# Patient Record
Sex: MALE | Race: WHITE | NOT HISPANIC OR LATINO | Employment: OTHER | URBAN - METROPOLITAN AREA
[De-identification: names, ages, dates, MRNs, and addresses within clinical notes are randomized per-mention and may not be internally consistent; named-entity substitution may affect disease eponyms.]

---

## 2017-02-16 PROBLEM — C88.0 WALDENSTROM MACROGLOBULINEMIA: Status: ACTIVE | Noted: 2017-02-16

## 2017-02-16 PROBLEM — C88.00 WALDENSTROM MACROGLOBULINEMIA: Status: ACTIVE | Noted: 2017-02-16

## 2019-04-25 PROBLEM — I10 ESSENTIAL HYPERTENSION: Status: ACTIVE | Noted: 2019-04-25

## 2019-04-25 PROBLEM — E66.811 CLASS 1 OBESITY WITH SERIOUS COMORBIDITY AND BODY MASS INDEX (BMI) OF 32.0 TO 32.9 IN ADULT: Status: ACTIVE | Noted: 2019-04-25

## 2019-04-25 PROBLEM — B35.4 TINEA CORPORIS: Status: ACTIVE | Noted: 2019-04-25

## 2019-04-25 PROBLEM — H40.2230 CHRONIC PRIMARY ANGLE-CLOSURE GLAUCOMA OF BOTH EYES: Status: ACTIVE | Noted: 2019-04-25

## 2019-04-25 PROBLEM — Z98.890 STATUS POST BALLOON DILATATION OF ESOPHAGEAL STRICTURE: Status: ACTIVE | Noted: 2019-04-25

## 2019-04-25 PROBLEM — E66.9 CLASS 1 OBESITY WITH SERIOUS COMORBIDITY AND BODY MASS INDEX (BMI) OF 32.0 TO 32.9 IN ADULT: Status: ACTIVE | Noted: 2019-04-25

## 2019-10-31 PROBLEM — B35.4 TINEA CORPORIS: Status: RESOLVED | Noted: 2019-04-25 | Resolved: 2019-10-31

## 2019-10-31 PROBLEM — E66.09 CLASS 1 OBESITY DUE TO EXCESS CALORIES WITH SERIOUS COMORBIDITY AND BODY MASS INDEX (BMI) OF 33.0 TO 33.9 IN ADULT: Status: ACTIVE | Noted: 2019-04-25

## 2020-05-26 PROBLEM — C67.9 MALIGNANT NEOPLASM OF URINARY BLADDER: Status: ACTIVE | Noted: 2020-05-26

## 2020-11-11 PROBLEM — K21.9 GASTROESOPHAGEAL REFLUX DISEASE WITHOUT ESOPHAGITIS: Status: ACTIVE | Noted: 2020-11-11

## 2021-01-22 ENCOUNTER — PATIENT MESSAGE (OUTPATIENT)
Dept: ADMINISTRATIVE | Facility: OTHER | Age: 81
End: 2021-01-22

## 2021-03-11 PROBLEM — J44.1 ACUTE EXACERBATION OF COPD WITH ASTHMA: Status: ACTIVE | Noted: 2021-03-11

## 2021-03-11 PROBLEM — I73.9 CLAUDICATION OF BOTH LOWER EXTREMITIES: Status: ACTIVE | Noted: 2021-03-11

## 2021-03-11 PROBLEM — J45.901 ACUTE EXACERBATION OF COPD WITH ASTHMA: Status: ACTIVE | Noted: 2021-03-11

## 2021-03-29 PROBLEM — G63 POLYNEUROPATHY IN OTHER DISEASES CLASSIFIED ELSEWHERE: Status: ACTIVE | Noted: 2021-03-29

## 2021-05-24 ENCOUNTER — TELEPHONE (OUTPATIENT)
Dept: UROLOGY | Facility: CLINIC | Age: 81
End: 2021-05-24

## 2021-05-31 ENCOUNTER — OFFICE VISIT (OUTPATIENT)
Dept: UROLOGY | Facility: CLINIC | Age: 81
End: 2021-05-31
Payer: MEDICARE

## 2021-05-31 VITALS — HEIGHT: 71 IN | BODY MASS INDEX: 32.44 KG/M2 | WEIGHT: 231.69 LBS

## 2021-05-31 DIAGNOSIS — Z85.51 HX OF BLADDER CANCER: Primary | ICD-10-CM

## 2021-05-31 PROCEDURE — 1159F PR MEDICATION LIST DOCUMENTED IN MEDICAL RECORD: ICD-10-PCS | Mod: S$GLB,,, | Performed by: UROLOGY

## 2021-05-31 PROCEDURE — 1157F PR ADVANCE CARE PLAN OR EQUIV PRESENT IN MEDICAL RECORD: ICD-10-PCS | Mod: S$GLB,,, | Performed by: UROLOGY

## 2021-05-31 PROCEDURE — 3288F FALL RISK ASSESSMENT DOCD: CPT | Mod: S$GLB,,, | Performed by: UROLOGY

## 2021-05-31 PROCEDURE — 1126F AMNT PAIN NOTED NONE PRSNT: CPT | Mod: S$GLB,,, | Performed by: UROLOGY

## 2021-05-31 PROCEDURE — 1101F PT FALLS ASSESS-DOCD LE1/YR: CPT | Mod: S$GLB,,, | Performed by: UROLOGY

## 2021-05-31 PROCEDURE — 99999 PR PBB SHADOW E&M-EST. PATIENT-LVL III: CPT | Mod: PBBFAC,,, | Performed by: UROLOGY

## 2021-05-31 PROCEDURE — 1157F ADVNC CARE PLAN IN RCRD: CPT | Mod: S$GLB,,, | Performed by: UROLOGY

## 2021-05-31 PROCEDURE — 1126F PR PAIN SEVERITY QUANTIFIED, NO PAIN PRESENT: ICD-10-PCS | Mod: S$GLB,,, | Performed by: UROLOGY

## 2021-05-31 PROCEDURE — 1159F MED LIST DOCD IN RCRD: CPT | Mod: S$GLB,,, | Performed by: UROLOGY

## 2021-05-31 PROCEDURE — 99203 OFFICE O/P NEW LOW 30 MIN: CPT | Mod: S$GLB,,, | Performed by: UROLOGY

## 2021-05-31 PROCEDURE — 99999 PR PBB SHADOW E&M-EST. PATIENT-LVL III: ICD-10-PCS | Mod: PBBFAC,,, | Performed by: UROLOGY

## 2021-05-31 PROCEDURE — 99203 PR OFFICE/OUTPT VISIT, NEW, LEVL III, 30-44 MIN: ICD-10-PCS | Mod: S$GLB,,, | Performed by: UROLOGY

## 2021-05-31 PROCEDURE — 1101F PR PT FALLS ASSESS DOC 0-1 FALLS W/OUT INJ PAST YR: ICD-10-PCS | Mod: S$GLB,,, | Performed by: UROLOGY

## 2021-05-31 PROCEDURE — 3288F PR FALLS RISK ASSESSMENT DOCUMENTED: ICD-10-PCS | Mod: S$GLB,,, | Performed by: UROLOGY

## 2021-05-31 RX ORDER — DICLOFENAC SODIUM 10 MG/G
GEL TOPICAL 2 TIMES DAILY
COMMUNITY
Start: 2021-05-11 | End: 2021-11-11

## 2021-05-31 RX ORDER — METHYLPREDNISOLONE 4 MG/1
TABLET ORAL
COMMUNITY
Start: 2021-03-17 | End: 2021-07-21

## 2021-06-04 DIAGNOSIS — N40.0 BENIGN PROSTATIC HYPERPLASIA, UNSPECIFIED WHETHER LOWER URINARY TRACT SYMPTOMS PRESENT: ICD-10-CM

## 2021-06-07 RX ORDER — MIRABEGRON 50 MG/1
50 TABLET, FILM COATED, EXTENDED RELEASE ORAL DAILY
Qty: 30 TABLET | Refills: 11 | Status: SHIPPED | OUTPATIENT
Start: 2021-06-07 | End: 2021-11-11 | Stop reason: SDUPTHER

## 2021-07-21 PROBLEM — I73.9 CLAUDICATION OF BOTH LOWER EXTREMITIES: Status: RESOLVED | Noted: 2021-03-11 | Resolved: 2021-07-21

## 2022-02-11 PROBLEM — M84.474A METATARSAL FRACTURE, PATHOLOGIC, RIGHT, INITIAL ENCOUNTER: Status: ACTIVE | Noted: 2022-02-11

## 2022-02-11 PROBLEM — I82.451 ACUTE DEEP VEIN THROMBOSIS (DVT) OF RIGHT PERONEAL VEIN: Status: ACTIVE | Noted: 2022-02-11

## 2022-02-21 ENCOUNTER — LAB VISIT (OUTPATIENT)
Dept: LAB | Facility: HOSPITAL | Age: 82
End: 2022-02-21
Attending: NURSE PRACTITIONER
Payer: MEDICARE

## 2022-02-21 DIAGNOSIS — E87.1 HYPOSMOLALITY SYNDROME: Primary | ICD-10-CM

## 2022-02-21 DIAGNOSIS — I50.30 HEART FAILURE, DIASTOLIC: ICD-10-CM

## 2022-02-21 LAB
ANION GAP SERPL CALC-SCNC: 10 MMOL/L (ref 8–16)
BUN SERPL-MCNC: 23 MG/DL (ref 8–23)
CALCIUM SERPL-MCNC: 9.1 MG/DL (ref 8.7–10.5)
CHLORIDE SERPL-SCNC: 99 MMOL/L (ref 95–110)
CO2 SERPL-SCNC: 26 MMOL/L (ref 23–29)
CREAT SERPL-MCNC: 1.1 MG/DL (ref 0.5–1.4)
EST. GFR  (AFRICAN AMERICAN): >60 ML/MIN/1.73 M^2
EST. GFR  (NON AFRICAN AMERICAN): >60 ML/MIN/1.73 M^2
GLUCOSE SERPL-MCNC: 80 MG/DL (ref 70–110)
POTASSIUM SERPL-SCNC: 4.5 MMOL/L (ref 3.5–5.1)
SODIUM SERPL-SCNC: 135 MMOL/L (ref 136–145)

## 2022-02-21 PROCEDURE — 80048 BASIC METABOLIC PNL TOTAL CA: CPT | Performed by: NURSE PRACTITIONER

## 2022-02-24 PROBLEM — D69.2 OTHER NONTHROMBOCYTOPENIC PURPURA: Status: ACTIVE | Noted: 2022-02-24

## 2022-03-05 PROBLEM — Z71.89 ACP (ADVANCE CARE PLANNING): Status: ACTIVE | Noted: 2022-03-05

## 2022-03-10 ENCOUNTER — EXTERNAL HOME HEALTH (OUTPATIENT)
Dept: HOME HEALTH SERVICES | Facility: HOSPITAL | Age: 82
End: 2022-03-10
Payer: MEDICARE

## 2022-03-15 PROBLEM — Z98.61 S/P PTCA (PERCUTANEOUS TRANSLUMINAL CORONARY ANGIOPLASTY): Status: ACTIVE | Noted: 2022-03-15

## 2022-03-15 PROBLEM — I25.10 CORONARY ARTERY DISEASE DUE TO LIPID RICH PLAQUE: Status: ACTIVE | Noted: 2022-03-15

## 2022-03-15 PROBLEM — I82.401 DEEP VEIN THROMBOSIS (DVT) OF RIGHT LOWER EXTREMITY: Status: ACTIVE | Noted: 2022-02-11

## 2022-03-15 PROBLEM — I25.83 CORONARY ARTERY DISEASE DUE TO LIPID RICH PLAQUE: Status: ACTIVE | Noted: 2022-03-15

## 2022-03-21 ENCOUNTER — LAB VISIT (OUTPATIENT)
Dept: LAB | Facility: HOSPITAL | Age: 82
End: 2022-03-21
Attending: INTERNAL MEDICINE
Payer: MEDICARE

## 2022-03-21 ENCOUNTER — DOCUMENT SCAN (OUTPATIENT)
Dept: HOME HEALTH SERVICES | Facility: HOSPITAL | Age: 82
End: 2022-03-21
Payer: MEDICARE

## 2022-03-21 DIAGNOSIS — C88.0 MACROGLOBULINEMIA: Primary | ICD-10-CM

## 2022-03-21 LAB
ALBUMIN SERPL BCP-MCNC: 3.4 G/DL (ref 3.5–5.2)
ALP SERPL-CCNC: 66 U/L (ref 55–135)
ALT SERPL W/O P-5'-P-CCNC: 17 U/L (ref 10–44)
ANION GAP SERPL CALC-SCNC: 10 MMOL/L (ref 8–16)
AST SERPL-CCNC: 22 U/L (ref 10–40)
BASOPHILS # BLD AUTO: 0.03 K/UL (ref 0–0.2)
BASOPHILS NFR BLD: 0.6 % (ref 0–1.9)
BILIRUB SERPL-MCNC: 0.2 MG/DL (ref 0.1–1)
BUN SERPL-MCNC: 17 MG/DL (ref 8–23)
CALCIUM SERPL-MCNC: 9.5 MG/DL (ref 8.7–10.5)
CHLORIDE SERPL-SCNC: 99 MMOL/L (ref 95–110)
CO2 SERPL-SCNC: 26 MMOL/L (ref 23–29)
CREAT SERPL-MCNC: 1 MG/DL (ref 0.5–1.4)
DIFFERENTIAL METHOD: ABNORMAL
EOSINOPHIL # BLD AUTO: 0.2 K/UL (ref 0–0.5)
EOSINOPHIL NFR BLD: 3.3 % (ref 0–8)
ERYTHROCYTE [DISTWIDTH] IN BLOOD BY AUTOMATED COUNT: 13.8 % (ref 11.5–14.5)
EST. GFR  (AFRICAN AMERICAN): >60 ML/MIN/1.73 M^2
EST. GFR  (NON AFRICAN AMERICAN): >60 ML/MIN/1.73 M^2
GLUCOSE SERPL-MCNC: 95 MG/DL (ref 70–110)
HCT VFR BLD AUTO: 38.7 % (ref 40–54)
HGB BLD-MCNC: 12.5 G/DL (ref 14–18)
IGA SERPL-MCNC: 157 MG/DL (ref 40–350)
IGG SERPL-MCNC: 743 MG/DL (ref 650–1600)
IGM SERPL-MCNC: 512 MG/DL (ref 50–300)
IMM GRANULOCYTES # BLD AUTO: 0.02 K/UL (ref 0–0.04)
IMM GRANULOCYTES NFR BLD AUTO: 0.4 % (ref 0–0.5)
LDH SERPL L TO P-CCNC: 295 U/L (ref 110–260)
LYMPHOCYTES # BLD AUTO: 1.2 K/UL (ref 1–4.8)
LYMPHOCYTES NFR BLD: 21.9 % (ref 18–48)
MCH RBC QN AUTO: 32.6 PG (ref 27–31)
MCHC RBC AUTO-ENTMCNC: 32.3 G/DL (ref 32–36)
MCV RBC AUTO: 101 FL (ref 82–98)
MONOCYTES # BLD AUTO: 0.5 K/UL (ref 0.3–1)
MONOCYTES NFR BLD: 9.1 % (ref 4–15)
NEUTROPHILS # BLD AUTO: 3.5 K/UL (ref 1.8–7.7)
NEUTROPHILS NFR BLD: 64.7 % (ref 38–73)
NRBC BLD-RTO: 0 /100 WBC
PLATELET # BLD AUTO: 175 K/UL (ref 150–450)
PMV BLD AUTO: 11 FL (ref 9.2–12.9)
POTASSIUM SERPL-SCNC: 4.3 MMOL/L (ref 3.5–5.1)
PROT SERPL-MCNC: 6.9 G/DL (ref 6–8.4)
PROT UR-MCNC: <7 MG/DL (ref 0–15)
RBC # BLD AUTO: 3.84 M/UL (ref 4.6–6.2)
SODIUM SERPL-SCNC: 135 MMOL/L (ref 136–145)
WBC # BLD AUTO: 5.4 K/UL (ref 3.9–12.7)

## 2022-03-21 PROCEDURE — 86334 PATHOLOGIST INTERPRETATION IFE: ICD-10-PCS | Mod: 26,,, | Performed by: PATHOLOGY

## 2022-03-21 PROCEDURE — 82784 ASSAY IGA/IGD/IGG/IGM EACH: CPT | Performed by: INTERNAL MEDICINE

## 2022-03-21 PROCEDURE — 80053 COMPREHEN METABOLIC PANEL: CPT | Mod: 91 | Performed by: INTERNAL MEDICINE

## 2022-03-21 PROCEDURE — 84165 PROTEIN E-PHORESIS SERUM: CPT | Mod: 26,,, | Performed by: PATHOLOGY

## 2022-03-21 PROCEDURE — 82784 ASSAY IGA/IGD/IGG/IGM EACH: CPT | Mod: 59 | Performed by: INTERNAL MEDICINE

## 2022-03-21 PROCEDURE — 86335 IMMUNFIX E-PHORSIS/URINE/CSF: CPT | Mod: 26,,, | Performed by: PATHOLOGY

## 2022-03-21 PROCEDURE — 84165 PROTEIN E-PHORESIS SERUM: CPT | Performed by: INTERNAL MEDICINE

## 2022-03-21 PROCEDURE — 86334 IMMUNOFIX E-PHORESIS SERUM: CPT | Performed by: INTERNAL MEDICINE

## 2022-03-21 PROCEDURE — 84166 PROTEIN E-PHORESIS/URINE/CSF: CPT | Performed by: INTERNAL MEDICINE

## 2022-03-21 PROCEDURE — 83615 LACTATE (LD) (LDH) ENZYME: CPT | Performed by: INTERNAL MEDICINE

## 2022-03-21 PROCEDURE — 84165 PATHOLOGIST INTERPRETATION SPE: ICD-10-PCS | Mod: 26,,, | Performed by: PATHOLOGY

## 2022-03-21 PROCEDURE — 83520 IMMUNOASSAY QUANT NOS NONAB: CPT | Performed by: INTERNAL MEDICINE

## 2022-03-21 PROCEDURE — 86334 IMMUNOFIX E-PHORESIS SERUM: CPT | Mod: 26,,, | Performed by: PATHOLOGY

## 2022-03-21 PROCEDURE — 86335 IMMUNFIX E-PHORSIS/URINE/CSF: CPT | Performed by: INTERNAL MEDICINE

## 2022-03-21 PROCEDURE — 84156 ASSAY OF PROTEIN URINE: CPT | Performed by: INTERNAL MEDICINE

## 2022-03-21 PROCEDURE — 84166 PROTEIN E-PHORESIS/URINE/CSF: CPT | Mod: 26,,, | Performed by: PATHOLOGY

## 2022-03-21 PROCEDURE — 86335 PATHOLOGIST INTERPRETATION UIFE: ICD-10-PCS | Mod: 26,,, | Performed by: PATHOLOGY

## 2022-03-21 PROCEDURE — 84166 PATHOLOGIST INTERPRETATION UPE: ICD-10-PCS | Mod: 26,,, | Performed by: PATHOLOGY

## 2022-03-21 PROCEDURE — 85025 COMPLETE CBC W/AUTO DIFF WBC: CPT | Performed by: INTERNAL MEDICINE

## 2022-03-22 LAB
ALBUMIN SERPL ELPH-MCNC: 3.48 G/DL (ref 3.35–5.55)
ALPHA1 GLOB SERPL ELPH-MCNC: 0.33 G/DL (ref 0.17–0.41)
ALPHA2 GLOB SERPL ELPH-MCNC: 0.79 G/DL (ref 0.43–0.99)
B-GLOBULIN SERPL ELPH-MCNC: 0.62 G/DL (ref 0.5–1.1)
GAMMA GLOB SERPL ELPH-MCNC: 0.97 G/DL (ref 0.67–1.58)
INTERPRETATION SERPL IFE-IMP: NORMAL
KAPPA LC SER QL IA: 2.56 MG/DL (ref 0.33–1.94)
KAPPA LC/LAMBDA SER IA: 1.18 (ref 0.26–1.65)
LAMBDA LC SER QL IA: 2.17 MG/DL (ref 0.57–2.63)
PATHOLOGIST INTERPRETATION IFE: NORMAL
PATHOLOGIST INTERPRETATION SPE: NORMAL
PROT SERPL-MCNC: 6.2 G/DL (ref 6–8.4)

## 2022-03-23 ENCOUNTER — DOCUMENT SCAN (OUTPATIENT)
Dept: HOME HEALTH SERVICES | Facility: HOSPITAL | Age: 82
End: 2022-03-23
Payer: MEDICARE

## 2022-03-23 LAB
INTERPRETATION UR IFE-IMP: NORMAL
PATHOLOGIST INTERPRETATION UIFE: NORMAL

## 2022-03-24 LAB
PATHOLOGIST INTERPRETATION UPE: NORMAL
PROT PATTERN UR ELPH-IMP: NORMAL

## 2022-05-02 ENCOUNTER — DOCUMENT SCAN (OUTPATIENT)
Dept: HOME HEALTH SERVICES | Facility: HOSPITAL | Age: 82
End: 2022-05-02
Payer: MEDICARE

## 2022-05-18 RX ORDER — FLUTICASONE PROPIONATE 220 UG/1
4 AEROSOL, METERED RESPIRATORY (INHALATION) DAILY
COMMUNITY
Start: 2022-03-09 | End: 2023-01-06 | Stop reason: SDUPTHER

## 2022-05-18 RX ORDER — SULFAMETHOXAZOLE AND TRIMETHOPRIM 800; 160 MG/1; MG/1
1 TABLET ORAL 2 TIMES DAILY
COMMUNITY
Start: 2022-03-09 | End: 2022-05-19

## 2022-05-19 ENCOUNTER — OFFICE VISIT (OUTPATIENT)
Dept: UROLOGY | Facility: CLINIC | Age: 82
End: 2022-05-19
Payer: MEDICARE

## 2022-05-19 VITALS — HEIGHT: 70 IN | BODY MASS INDEX: 32.79 KG/M2 | WEIGHT: 229.06 LBS

## 2022-05-19 DIAGNOSIS — Z85.51 HX OF BLADDER CANCER: Primary | ICD-10-CM

## 2022-05-19 LAB
BILIRUB SERPL-MCNC: NORMAL MG/DL
BLOOD URINE, POC: NORMAL
CLARITY, POC UA: CLEAR
COLOR, POC UA: YELLOW
GLUCOSE UR QL STRIP: NORMAL
KETONES UR QL STRIP: NORMAL
LEUKOCYTE ESTERASE URINE, POC: NORMAL
NITRITE, POC UA: NORMAL
PH, POC UA: 5.5
PROTEIN, POC: NORMAL
SPECIFIC GRAVITY, POC UA: 1.02
UROBILINOGEN, POC UA: NORMAL

## 2022-05-19 PROCEDURE — 81002 POCT URINE DIPSTICK WITHOUT MICROSCOPE: ICD-10-PCS | Mod: S$GLB,,, | Performed by: UROLOGY

## 2022-05-19 PROCEDURE — 1100F PTFALLS ASSESS-DOCD GE2>/YR: CPT | Mod: CPTII,S$GLB,, | Performed by: UROLOGY

## 2022-05-19 PROCEDURE — 3288F PR FALLS RISK ASSESSMENT DOCUMENTED: ICD-10-PCS | Mod: CPTII,S$GLB,, | Performed by: UROLOGY

## 2022-05-19 PROCEDURE — 1159F MED LIST DOCD IN RCRD: CPT | Mod: CPTII,S$GLB,, | Performed by: UROLOGY

## 2022-05-19 PROCEDURE — 1157F PR ADVANCE CARE PLAN OR EQUIV PRESENT IN MEDICAL RECORD: ICD-10-PCS | Mod: CPTII,S$GLB,, | Performed by: UROLOGY

## 2022-05-19 PROCEDURE — 99213 OFFICE O/P EST LOW 20 MIN: CPT | Mod: S$GLB,,, | Performed by: UROLOGY

## 2022-05-19 PROCEDURE — 81002 URINALYSIS NONAUTO W/O SCOPE: CPT | Mod: S$GLB,,, | Performed by: UROLOGY

## 2022-05-19 PROCEDURE — 1100F PR PT FALLS ASSESS DOC 2+ FALLS/FALL W/INJURY/YR: ICD-10-PCS | Mod: CPTII,S$GLB,, | Performed by: UROLOGY

## 2022-05-19 PROCEDURE — 99999 PR PBB SHADOW E&M-EST. PATIENT-LVL III: CPT | Mod: PBBFAC,,, | Performed by: UROLOGY

## 2022-05-19 PROCEDURE — 99213 PR OFFICE/OUTPT VISIT, EST, LEVL III, 20-29 MIN: ICD-10-PCS | Mod: S$GLB,,, | Performed by: UROLOGY

## 2022-05-19 PROCEDURE — 1159F PR MEDICATION LIST DOCUMENTED IN MEDICAL RECORD: ICD-10-PCS | Mod: CPTII,S$GLB,, | Performed by: UROLOGY

## 2022-05-19 PROCEDURE — 1126F AMNT PAIN NOTED NONE PRSNT: CPT | Mod: CPTII,S$GLB,, | Performed by: UROLOGY

## 2022-05-19 PROCEDURE — 1157F ADVNC CARE PLAN IN RCRD: CPT | Mod: CPTII,S$GLB,, | Performed by: UROLOGY

## 2022-05-19 PROCEDURE — 1126F PR PAIN SEVERITY QUANTIFIED, NO PAIN PRESENT: ICD-10-PCS | Mod: CPTII,S$GLB,, | Performed by: UROLOGY

## 2022-05-19 PROCEDURE — 3288F FALL RISK ASSESSMENT DOCD: CPT | Mod: CPTII,S$GLB,, | Performed by: UROLOGY

## 2022-05-19 PROCEDURE — 99999 PR PBB SHADOW E&M-EST. PATIENT-LVL III: ICD-10-PCS | Mod: PBBFAC,,, | Performed by: UROLOGY

## 2022-05-19 RX ORDER — VITAMIN E 268 MG
400 CAPSULE ORAL EVERY OTHER DAY
COMMUNITY

## 2022-05-19 RX ORDER — LATANOPROST 50 UG/ML
1 SOLUTION/ DROPS OPHTHALMIC DAILY PRN
COMMUNITY
Start: 2022-04-11 | End: 2022-05-19 | Stop reason: SDUPTHER

## 2022-05-19 RX ORDER — PREDNISONE 50 MG/1
50 TABLET ORAL EVERY MORNING
COMMUNITY
Start: 2022-05-03 | End: 2022-05-19

## 2022-05-19 RX ORDER — GUAIFENESIN 600 MG/1
1200 TABLET, EXTENDED RELEASE ORAL NIGHTLY
COMMUNITY
End: 2023-04-11

## 2022-05-19 NOTE — PROGRESS NOTES
Subjective:       Patient ID: Tripp Myers is a 81 y.o. male.    Chief Complaint: Follow-up and cyst on kidneys    HPI     81-year-old with a distant history of bladder cancer.  He says he was diagnosed in 1993.  His urologist at that time was Dr. Chen.  He said he had noninvasive bladder cancer.  He since has had regular surveillance cystoscopy and there has been no evidence of recurrence.  He was not treated with any intravesical chemotherapy or immunotherapy.  He also has BPH and had Urolift in 2018 and then a repeat Urolift in 2020.  His last cystoscopy was October 2020.  He still has bothersome urinary symptoms although he says now they have improved with Myrbetriq.  He denies gross hematuria.   he complains of low back pain when he wakes up in the morning and after he gets up and moves around the pain resolved.  He has a known renal cyst is concerned that the cyst may be causing this pain.  Urine dipstick shows negative for all components.       Review of Systems   Constitutional: Negative for fever.   Genitourinary: Negative for dysuria and hematuria.       Objective:      Physical Exam  Vitals reviewed.   Constitutional:       Appearance: He is well-developed.   Pulmonary:      Effort: Pulmonary effort is normal.   Skin:     Findings: No rash.   Neurological:      Mental Status: He is alert and oriented to person, place, and time.         Assessment:       1. Hx of bladder cancer        Plan:       Hx of bladder cancer  -     POCT URINE DIPSTICK WITHOUT MICROSCOPE  -     Cystoscopy; Future      his low back pain is likely musculoskeletal in origin.  follow-up 6 months for cystoscopy

## 2022-05-20 ENCOUNTER — DOCUMENT SCAN (OUTPATIENT)
Dept: HOME HEALTH SERVICES | Facility: HOSPITAL | Age: 82
End: 2022-05-20
Payer: MEDICARE

## 2022-11-16 PROBLEM — J90 CHRONIC BILATERAL PLEURAL EFFUSIONS: Status: ACTIVE | Noted: 2022-11-16

## 2022-11-16 PROBLEM — K22.89 PRESBYESOPHAGUS: Status: ACTIVE | Noted: 2022-11-16

## 2022-11-22 DIAGNOSIS — N40.0 BENIGN PROSTATIC HYPERPLASIA, UNSPECIFIED WHETHER LOWER URINARY TRACT SYMPTOMS PRESENT: ICD-10-CM

## 2022-11-22 RX ORDER — MIRABEGRON 50 MG/1
50 TABLET, FILM COATED, EXTENDED RELEASE ORAL DAILY
Qty: 30 TABLET | Refills: 11
Start: 2022-11-22 | End: 2022-11-28

## 2022-12-01 ENCOUNTER — PROCEDURE VISIT (OUTPATIENT)
Dept: UROLOGY | Facility: CLINIC | Age: 82
End: 2022-12-01
Payer: MEDICARE

## 2022-12-01 VITALS — HEIGHT: 70 IN | WEIGHT: 238 LBS | BODY MASS INDEX: 34.07 KG/M2

## 2022-12-01 DIAGNOSIS — Z85.51 HX OF BLADDER CANCER: ICD-10-CM

## 2022-12-01 PROCEDURE — 52000 CYSTOURETHROSCOPY: CPT | Mod: S$GLB,,, | Performed by: UROLOGY

## 2022-12-01 PROCEDURE — 52000 CYSTOSCOPY: ICD-10-PCS | Mod: S$GLB,,, | Performed by: UROLOGY

## 2022-12-01 NOTE — PROCEDURES
Cystoscopy    Date/Time: 12/1/2022 1:00 PM  Performed by: MAYNOR Riojas MD  Authorized by: MAYNOR Riojas MD     Consent Done?:  Yes (Written)  Timeout: prior to procedure the correct patient, procedure, and site was verified    Prep: patient was prepped and draped in usual sterile fashion    Anesthesia:  Lidocaine jelly  Indications: history bladder cancer and BPH    Position:  Supine  Anesthesia:  Lidocaine jelly  Patient sedated?: No    Preparation: Patient was prepped and draped in usual sterile fashion    Scope type:  Flexible cystoscope   patient tolerated the procedure well with no immediate complications    Blood Loss:  None    82-year-old with a distant history of bladder cancer.  He says he was diagnosed in 1993.  His urologist at that time was Dr. Chen.  He said he had noninvasive bladder cancer.  The original path is not available to me today.  He since has had regular surveillance cystoscopy and there has been no evidence of recurrence.  He was not treated with any intravesical chemotherapy or immunotherapy.  Most recently he was followed by Dr. Boyer.  He also has BPH and had Urolift in 2018 and then a repeat Urolift in 2020.  His last cystoscopy was October 2020.    The flexible cystoscope was placed into the urethra and carefully advanced into the bladder.  A careful cystoscopic exam was then performed.  The entire bladder mucosa was systematically visualized.  Findings include moderate bladder wall trabeculation.  There were no lesions, masses foreign bodies or stones.   Each ureteral orifices were visualized and both had clear efflux of urine.  On retroflexion there was a moderate sized intravesical gland.  The cystoscope was then removed and I examined the entire length of the urethra.  There was moderate trilobar enlargement of the prostate otherwise the urethra appeared normal.  He tolerated the procedure well.  There were no complications    Impression:  No evidence of  recurrence    Plan:  Follow-up 2 years for cystoscopy

## 2023-01-06 PROBLEM — I51.9 SYSTOLIC DYSFUNCTION: Status: ACTIVE | Noted: 2023-01-06

## 2023-01-19 PROBLEM — I35.0 NONRHEUMATIC AORTIC VALVE STENOSIS: Status: ACTIVE | Noted: 2023-01-19

## 2023-01-19 PROBLEM — I25.5 CARDIOMYOPATHY, ISCHEMIC: Status: ACTIVE | Noted: 2023-01-19

## 2023-04-11 PROBLEM — R26.9 GAIT ABNORMALITY: Status: ACTIVE | Noted: 2023-04-11

## 2023-04-11 PROBLEM — R29.898 MUSCULAR DECONDITIONING: Status: ACTIVE | Noted: 2023-04-11

## 2023-05-09 ENCOUNTER — CLINICAL SUPPORT (OUTPATIENT)
Dept: REHABILITATION | Facility: HOSPITAL | Age: 83
End: 2023-05-09
Payer: MEDICARE

## 2023-05-09 DIAGNOSIS — R26.89 DECREASED FUNCTIONAL MOBILITY: ICD-10-CM

## 2023-05-09 DIAGNOSIS — R26.89 IMPAIRMENT OF BALANCE: ICD-10-CM

## 2023-05-09 DIAGNOSIS — R29.898 MUSCULAR DECONDITIONING: ICD-10-CM

## 2023-05-09 PROCEDURE — 97530 THERAPEUTIC ACTIVITIES: CPT | Mod: PN

## 2023-05-09 PROCEDURE — 97161 PT EVAL LOW COMPLEX 20 MIN: CPT | Mod: PN

## 2023-05-09 PROCEDURE — 97110 THERAPEUTIC EXERCISES: CPT | Mod: PN

## 2023-05-09 NOTE — PLAN OF CARE
[facility]  Physical Therapy Initial Evaluation    Name: Tripp Myers  Clinic Number: [id number]    Therapy Diagnosis:   Encounter Diagnoses   Name Primary?    Muscular deconditioning     Impairment of balance     Decreased functional mobility      Physician: Angel Hernandez*    Physician Orders: PT Eval and Treat   Medical Diagnosis from Referral:   R26.9 (ICD-10-CM) - Gait abnormality   R29.898 (ICD-10-CM) - Muscular deconditioning     Evaluation Date: 5/9/2023  Authorization Period Expiration: 5/2/24  Plan of Care Expiration: 7/7/23  Visit # / Visits authorized: 1/ 1    Time In: 11:30  Time Out: 12:25  Total Billable Time: 50 minutes    Precautions: Standard and pacemaker    Subjective   Date of onset: 4-5 weeks ago  History of current condition - Chad reports: he had a fall.  He was getting off of the commode and slipped onto the floor.  The  had to come help him up.  He states he has LBP, neuropathy to B LE, B drop feet.  He states within the last couple of weeks he has not been able to stand from a chair without arm rests and cannot go up/down back stairs.  He states he is not able to lift more than 8lbs with B UE.     Medical History:   Past Medical History:   Diagnosis Date    Asthma     Atrial fibrillation     Cancer     Bladder CA    Cardiac pacemaker in situ 6/1/2010    9/15 Biotronik    Cardiomyopathy, ischemic 1/19/2023 12/22 Moderate left atrial enlargement. Mildly decreased systolic function. The estimated ejection fraction is 40%. Grade III left ventricular diastolic dysfunction. Normal right ventricular size with normal right ventricular systolic function. There is mild aortic valve stenosis. Aortic valve area is 1.30 cm2; peak velocity is 1.31 m/s; mean gradient is 4 mmHg. Mild mitral regurgitation. Mild tri    Coronary artery disease     Coronary artery disease involving native coronary artery of native heart without angina pectoris 3/15/2022     3/22 Left main coronary artery-normal size vessel normal appearance. Left anterior descending-medium size vessel diffuse disease proximal mid up to proximally 45%.  First diagonal long branching vessel minimal disease less than 30%. Circumflex-normal size vessel long branching 1st obtuse marginal 100% occluded in its midportion with PETER 0 flow thereafter.  Second obtuse marginal distal circumflex    Diastolic CHF 5/16/2016    Hyperlipidemia 3/17/2011    Hypertension     Lumbar degenerative disc disease     MGUS (monoclonal gammopathy of unknown significance)     Neuropathy associated with MGUS     Nonrheumatic aortic valve stenosis 1/19/2023 12/212 Moderate left atrial enlargement. Mildly decreased systolic function. The estimated ejection fraction is 40%. Grade III left ventricular diastolic dysfunction. Normal right ventricular size with normal right ventricular systolic function. There is mild aortic valve stenosis. Aortic valve area is 1.30 cm2; peak velocity is 1.31 m/s; mean gradient is 4 mmHg. Mild mitral regurgitation. Mild tr    NSTEMI (non-ST elevated myocardial infarction)     Pacemaker     Paroxysmal atrial fibrillation 9/14/2015    S/P PTCA (percutaneous transluminal coronary angioplasty) - OM1 3/15/2022    3/22 IFR of the LAD was 0.93 IFR of the 2nd obtuse marginal 0.94. Intervention: Successful angioplasty of the midportion the 1st obtuse marginal using 2.0 mm balloon with PETER 1 flow.  Found have a 2nd occlusive lesions in the distal obtuse marginal underwent several angioplasties using 1.5 mm balloon up to 5 minute inflation restoring PETER 3 flow with less than 15% residual stenosis.  No stent pl       Surgical History:   Tripp Naranjomyrtle  has a past surgical history that includes Insert / replace / remove pacemaker; Tonsillectomy; Retinal detachment repair w/ scleral buckle; Cardiac catheterization; Fracture surgery; Eye surgery; Atrial ablation surgery; Cataract extraction w/  intraocular  lens implant (Bilateral); Lumbar epidural injection; Left heart catheterization (Left, 02/14/2022); Coronary angiography (N/A, 02/14/2022); Percutaneous transluminal balloon angioplasty of coronary artery (03/05/2022); Adenoidectomy; Prostate surgery; and Colon surgery.    Medications:   Tripp has a current medication list which includes the following prescription(s): acetaminophen, allopurinol, amiodarone, aspirin, azelastine, cholecalciferol (vitamin d3), cyanocobalamin, diclofenac sodium, eliquis, flovent hfa, fluticasone propionate, furosemide, glucosamine/chondr morgan a sod, ketoconazole, latanoprost, levothyroxine, magnesium, metolazone, metoprolol succinate, myrbetriq, omeprazole, trelegy ellipta, valsartan, and vitamin e.    Allergies:   Review of patient's allergies indicates:   Allergen Reactions    No known allergies         Imaging, see Epic for chest xray    Prior Therapy:  PT for 2 weeks  Social History: pt lives with their spouse (she assists with placing rollator in/out of car, spouse has dementia), 5 steps to enter house with 1 rail  Occupation: retired  Prior Level of Function: ambulating with rollator, able to stand from chair without arm rests, able to ascend/descend stair with 1 rail mod I  Current Level of Function: unable to ascend/descend stair without assistance, unable to stand from chair without UE support    Pain:  Current 0/10, worst 9/10, best 0/10   Location: midline low back   Description: Sharp  Aggravating Factors: Standing and walking  Easing Factors: sitting    Pts goals: increased strength for independence with stairs and sit to stand      Objective     Observation: pt is pleasant and cooperative    Posture: fwd head, rounded shoulders    LE ROM WFL in all planes      Lower Extremity Strength  Left LE  Right LE    Knee extension: 4+/5 Knee extension: 4/5   Knee flexion: 3/5 Knee flexion: 4/5   Hip flexion: 3/5 Hip flexion: 3/5   Hip extension: bridge 3/5 Hip extension: bridge  3/5   Hip abduction: 2+/5 Hip abduction: 3-/5   Hip adduction: 3/5 Hip adduction 3+/5   Ankle dorsiflexion: trace Ankle dorsiflexion: trace        Function:  Tandem: unable to perform due to decreased balance  - SLS R: unable to perform  due to decreased balance  - SLS L: unable to perform  due to decreased balance  - Sit <--> Stand:pt requires min A to stand from chair with arm rests    - Bed Mobility: mod I for all aspects for increased time    Edema: none noted to LEs today    PT Evaluation Completed? Yes  Discussed Plan of Care with patient: Yes      TREATMENT   Treatment Time In: 12:00  Treatment Time Out: 12:25  Total Treatment time separate from Evaluation: 25 minutes    Chad received therapeutic exercises to develop strength and core stabilization for 15 minutes including:  Bridges x10, 5 sec hold  LAQ 2# x10 ea  Seated march x10  Seated hip add ball x10  Seated hip abd GTB x10  Standing march with elevated mat for UE support and SBA for safety x10    May add: standing hip abd, standing hip add, standing hip extension, standing balance progressions    Chad participated in dynamic functional therapeutic activities to improve functional performance for 10  minutes, including:  Sit to stand from elevated mat x10 (vc for fwd trunk lean, glute set and upright posture) SBA  Pt ambulated outside with rollator.  He required min A descending ramp for balance due to LOB.  He came to clinic on his own.  He required assistance to place rollator in trunk.  Pt instructed to attend PT with someone to assist or to call when he arrives so a PT can assist him into clinic for safety.    Home Exercises and Patient Education Provided    Education provided:   - role of PT  -importance of having assistance in/out clinic for safety and balance  -importance of compliance with HEP to meet goals.  Pt gave verbal understanding to all education provided     Written Home Exercises Provided: yes.  Exercises were reviewed and Chad was able  to demonstrate them prior to the end of the session.  Chad demonstrated good  understanding of the education provided.     See EMR under Patient Instructions for exercises provided 5/9/23.    Assessment   Tripp is a 82 y.o. male referred to outpatient Physical Therapy with a medical diagnosis of   R26.9 (ICD-10-CM) - Gait abnormality   R29.898 (ICD-10-CM) - Muscular deconditioning   . Pt presents with decreased LE strength, decreased endurance, decreased balance, decreased safety awareness, decreased functional mobility.  Pt will benefit from PT for LE strengthening, balance training, endurance training, education on safety with functional mobility, gait training.    Pt prognosis is Good.   Pt will benefit from skilled outpatient Physical Therapy to address the deficits stated above and in the chart below, provide pt/family education, and to maximize pt's level of independence.     Plan of care discussed with patient: Yes  Pt's spiritual, cultural and educational needs considered and patient is agreeable to the plan of care and goals as stated below:     Anticipated Barriers for therapy: pt requires assistance transferring from car to/from clinic for safety    Medical Necessity is demonstrated by the following  History  Co-morbidities and personal factors that may impact the plan of care Co-morbidities:   history of cancer, HTN, and Afib    Personal Factors:   lifestyle     high   Examination  Body Structures and Functions, activity limitations and participation restrictions that may impact the plan of care Body Regions:   lower extremities  trunk    Body Systems:    strength  balance  gait  transfers    Participation Restrictions:        Activity limitations:   Learning and applying knowledge  no deficits    General Tasks and Commands  no deficits    Communication  no deficits    Mobility  walking    Self care  toileting    Domestic Life  shopping  cooking  doing house work (cleaning house, washing dishes,  laundry)    Interactions/Relationships  no deficits    Life Areas  no deficits    Community and Social Life  community life  recreation and leisure         high   Clinical Presentation stable and uncomplicated low   Decision Making/ Complexity Score: low       GOALS:   Short Term Goals:  4 weeks (progressing, not met)  Pt will perform sit to stand from 20in seat height without UE support 3/3 trials.  Pt will perform standing balance with feet together x 30 sec for increased safety with standing activities.  Pt will ambulate up/down ramp with rollator and SBA.  Pt will increase R LE strength by 1/3 muscle grade in all deficient planes for increased ease with ADLs and work activities.  Pt will increase L LE strength by 1/3 muscle grade in all deficient planes for increased ease with ADLs and work activities.  Pt will be independent and consistent with issued HEP in order to show carryover between therapy sessions.  Pt will ascend/descend 4 steps with 1 rail and CGA for safety.    Long Term Goals: 8 weeks (progressing, not met)  Pt will perform sit to stand from 20in seat height without UE support 5/5 trials.  Pt will perform standing balance with feet together x1 min for increased safety with standing activities.  Pt will increase R LE strength by 1 muscle grade in all deficient planes  in order to increase tolerance to functional activities and ADLs.  Pt will increase L LE strength by 1 muscle grade in all deficient planes  in order to increase tolerance to functional activities and ADLs.  Pt will be independent with updated HEP to maintain gains following discharge with therapy.  Pt will ascend/descend ramp with rollator and supervision for safety.  Pt will ascend/descend 8 steps with 1 rail with SBA.      Plan   Plan of care Certification: 5/9/2023 to 7/7/23.    Outpatient Physical Therapy 2 times weekly for 8 weeks to include the following interventions: Gait Training, Manual Therapy, Moist Heat/ Ice,  Neuromuscular Re-ed, Patient Education, Self Care, Therapeutic Activities, and Therapeutic Exercise.     Peg Elizabeth, PT

## 2023-05-15 ENCOUNTER — CLINICAL SUPPORT (OUTPATIENT)
Dept: REHABILITATION | Facility: HOSPITAL | Age: 83
End: 2023-05-15
Payer: MEDICARE

## 2023-05-15 DIAGNOSIS — R29.898 MUSCULAR DECONDITIONING: ICD-10-CM

## 2023-05-15 DIAGNOSIS — R26.89 DECREASED FUNCTIONAL MOBILITY: ICD-10-CM

## 2023-05-15 DIAGNOSIS — R26.89 IMPAIRMENT OF BALANCE: Primary | ICD-10-CM

## 2023-05-15 PROCEDURE — 97530 THERAPEUTIC ACTIVITIES: CPT | Mod: PN

## 2023-05-15 PROCEDURE — 97110 THERAPEUTIC EXERCISES: CPT | Mod: PN

## 2023-05-15 NOTE — PROGRESS NOTES
[facility]   Physical Therapy Treatment Note      Name: Tripp Myers  Clinic Number: [id number]    Therapy Diagnosis:   Encounter Diagnoses   Name Primary?    Impairment of balance Yes    Muscular deconditioning     Decreased functional mobility      Physician: Angel Hernandez*    Visit Date: 5/15/2023    Physician Orders: PT Eval and Treat   Medical Diagnosis from Referral:   R26.9 (ICD-10-CM) - Gait abnormality   R29.898 (ICD-10-CM) - Muscular deconditioning     Evaluation Date: 5/9/2023  Authorization Period Expiration: 5/2/24  Plan of Care Expiration: 7/7/23  Visit # / Visits authorized: 1/ 1    Time In: 10:06  Time Out: 10:46  Total Billable Time: 40 minutes    Precautions: Standard and pacemaker  PTA Visit #: 0/5       Subjective     Pt reports: he had a fall last week because his R knee buckled.  He caught himself with his arms on the rollator and then sat on the floor.  He was unable to get up on his own so they called the firemen to assist him.  He was compliant with home exercise program.  Response to previous treatment: did well  Functional change: too soon to tell    Pain: 0/10  Location:  NA     Objective      Objective Measures updated at progress report unless specified.     Treatment     Chad received the treatments listed below:      therapeutic exercises to develop strength and endurance for 13 minutes including:  Standing march in // bars CGA 2x10 ea  Standing hip abd in // bars CGA 2x10 ea  Standing hip ext  in // bars CGA 2x10 ea  Seated hip add ball x20, 5 sec hold    Not performed due to time:  Bridges x10, 5 sec hold  LAQ 2# x10 ea  Seated march x10  Seated hip abd GTB x10    May add: standing balance progressions    Chad participated in dynamic functional therapeutic activities to improve functional performance for 25  minutes, including:  Transfer to chair with rollator and CGA (vc for safety and sequencing)  Sit to stand from 18in chair + foam CGA  and arm rests (vc to scoot to edge of seat)  Sit to stand from elevated mat x10 (vc for fwd trunk lean, glute set and upright posture) CGA  Pt ambulated outside with rollator.  He required min A descending ramp for balance and to guide rollator.    Pt required min A for transfer to car: SPT with rollator (vc for sequencing and safety).  Pt has to lift LE into car with UEs.  Emphasized importance of pt either coming to clinic with someone to assist him or call clinic to have PT assist him from car to clinic to prevent falls.  Pt gave verbal understanding.    neuromuscular re-education activities to improve: Balance, Sense, and Proprioception for 2 minutes. The following activities were included:  FTEO 2x30 sec with finger taps as needed  Modified tandem x30 sec ea finger taps and CGA as needed      Patient Education and Home Exercises       Education provided:   - Emphasized importance of pt either coming to clinic with someone to assist him or call clinic to have PT assist him from car to clinic to prevent falls.  Pt gave verbal undertstanding.  -safety and sequencing with sit to stand transfers  Pt gave verbal understanding to all education provided     Written Home Exercises Provided: Patient instructed to cont prior HEP. Exercises were reviewed and Chad was able to demonstrate them prior to the end of the session.  Chad demonstrated good  understanding of the education provided. See EMR under Patient Instructions for exercises provided during therapy sessions    Assessment     Pt continues to demonstrate global deconditioning and muscle weakness.  Pt presents with decreased eccentric quad control with stand to sit.  Pt presents with decreased balance and safety awareness.  He required at least CGA for all activities and cues throughout for safety and sequencing.  He will continue to benefit from PT for balance, strength, endurance, and safety training for improved functional mobility.    Chad Is progressing well  towards his goals.   Pt prognosis is Fair.     Pt will continue to benefit from skilled outpatient physical therapy to address the deficits listed in the problem list box on initial evaluation, provide pt/family education and to maximize pt's level of independence in the home and community environment.     Pt's spiritual, cultural and educational needs considered and pt agreeable to plan of care and goals.     Anticipated barriers to physical therapy: pt does not have consistent assistance to get to PT appts.    Goals: Short Term Goals:  4 weeks (progressing, not met)  Pt will perform sit to stand from 20in seat height without UE support 3/3 trials.  Pt will perform standing balance with feet together x 30 sec for increased safety with standing activities.  Pt will ambulate up/down ramp with rollator and SBA.  Pt will increase R LE strength by 1/3 muscle grade in all deficient planes for increased ease with ADLs and work activities.  Pt will increase L LE strength by 1/3 muscle grade in all deficient planes for increased ease with ADLs and work activities.  Pt will be independent and consistent with issued HEP in order to show carryover between therapy sessions.  Pt will ascend/descend 4 steps with 1 rail and CGA for safety.    Long Term Goals: 8 weeks (progressing, not met)  Pt will perform sit to stand from 20in seat height without UE support 5/5 trials.  Pt will perform standing balance with feet together x1 min for increased safety with standing activities.  Pt will increase R LE strength by 1 muscle grade in all deficient planes  in order to increase tolerance to functional activities and ADLs.  Pt will increase L LE strength by 1 muscle grade in all deficient planes  in order to increase tolerance to functional activities and ADLs.  Pt will be independent with updated HEP to maintain gains following discharge with therapy.  Pt will ascend/descend ramp with rollator and supervision for safety.  Pt will  ascend/descend 8 steps with 1 rail with SBA.    Plan     Continue PT towards established goals. Progress standing balance and LE strengthening as tolerated.  Transfer and safety training    Plan of care Certification: 5/9/2023 to 7/7/23.    Outpatient Physical Therapy 2 times weekly for 8 weeks to include the following interventions: Gait Training, Manual Therapy, Moist Heat/ Ice, Neuromuscular Re-ed, Patient Education, Self Care, Therapeutic Activities, and Therapeutic Exercise.     Peg Elizabeth, PT

## 2023-05-18 NOTE — PROGRESS NOTES
[facility]   Physical Therapy Treatment Note      Name: Tripp Myers  Clinic Number: [id number]    Therapy Diagnosis:   Encounter Diagnoses   Name Primary?    Impairment of balance Yes    Muscular deconditioning     Decreased functional mobility        Physician: Angel Hernandez*    Visit Date: 5/19/2023    Physician Orders: PT Eval and Treat   Medical Diagnosis from Referral:   R26.9 (ICD-10-CM) - Gait abnormality   R29.898 (ICD-10-CM) - Muscular deconditioning     Evaluation Date: 5/9/2023  Authorization Period Expiration: 12/31/23  Plan of Care Expiration: 7/7/23  Visit # / Visits authorized: 2/ 11 +eval    Time In: 9:25  Time Out: 10:05  Total Billable Time: 40 minutes    Precautions: Standard and pacemaker  PTA Visit #: 1/5       Subjective     Pt reports: no falls since his last therapy session. Overall feeling okay today, no increase of soreness or pain. Sometimes does feel some discomfort to the low back.   He was compliant with home exercise program.  Response to previous treatment: did well  Functional change: too soon to tell    Pain: 0/10  Location:  NA     Objective      Objective Measures updated at progress report unless specified.     Treatment     Chad received the treatments listed below:      therapeutic exercises to develop strength and endurance for 13 minutes including:  Standing march in // bars CGA 2x10 ea  Standing hip abd in // bars CGA 2x10 ea  Standing hip ext  in // bars CGA 2x10 ea  Seated hip add ball x20, 5 sec hold    Not performed due to time:  Bridges x10, 5 sec hold  LAQ 2# x10 ea  Seated march x10  Seated hip abd GTB x10    May add: standing balance progressions    Chad participated in dynamic functional therapeutic activities to improve functional performance for 25  minutes, including:  Transfer to chair with rollator and CGA (vc for safety and sequencing)  Sit to stand from 18in arm chair + foam - CGA and arm rests (vc to scoot to edge  of seat)  Sit to stand from elevated mat x10 (vc for fwd trunk lean, glute set and upright posture) CGA  Pt ambulated outside with rollator.  He required min A descending ramp for balance and to guide rollator.    Pt required close supervision for transfer to car: SPT with rollator (vc for sequencing and safety).  Pt has to lift LE into car with UEs.  Emphasized importance of pt either coming to clinic with someone to assist him or call clinic to have PT assist him from car to clinic to prevent falls.  Pt gave verbal understanding.    neuromuscular re-education activities to improve: Balance, Sense, and Proprioception for 2 minutes. The following activities were included:  FTEO 2x30 sec with finger taps as needed  Modified tandem x30 sec ea finger taps and CGA as needed      Patient Education and Home Exercises       Education provided:   - Emphasized importance of pt either coming to clinic with someone to assist him or call clinic to have PT assist him from car to clinic to prevent falls.  Pt gave verbal undertstanding.  -safety and sequencing with sit to stand transfers  Pt gave verbal understanding to all education provided     Written Home Exercises Provided: Patient instructed to cont prior HEP. Exercises were reviewed and Chad was able to demonstrate them prior to the end of the session.  Chad demonstrated good  understanding of the education provided. See EMR under Patient Instructions for exercises provided during therapy sessions    Assessment     Pt with overall good tolerance to completed exercises but continues to demonstrate global deconditioning and muscle weakness. He was very challenged with modified tandem standing today and required mod assist of UEs with sit to stands. VC required for increased quad loading with sit to stands as well to reduce UE assistance. Presents with impaired balance and safety awareness with most activities, limited L LE control due to decreased hamstring activation and  foot drop.  He required at least SBA/CGA for all activities and cues throughout for safety and sequencing.  He will continue to benefit from PT for balance, strength, endurance, and safety training for improved functional mobility.    Chad Is progressing well towards his goals.   Pt prognosis is Fair.     Pt will continue to benefit from skilled outpatient physical therapy to address the deficits listed in the problem list box on initial evaluation, provide pt/family education and to maximize pt's level of independence in the home and community environment.     Pt's spiritual, cultural and educational needs considered and pt agreeable to plan of care and goals.     Anticipated barriers to physical therapy: pt does not have consistent assistance to get to PT appts.    Goals: Short Term Goals:  4 weeks (progressing, not met)  Pt will perform sit to stand from 20in seat height without UE support 3/3 trials.  Pt will perform standing balance with feet together x 30 sec for increased safety with standing activities.  Pt will ambulate up/down ramp with rollator and SBA.  Pt will increase R LE strength by 1/3 muscle grade in all deficient planes for increased ease with ADLs and work activities.  Pt will increase L LE strength by 1/3 muscle grade in all deficient planes for increased ease with ADLs and work activities.  Pt will be independent and consistent with issued HEP in order to show carryover between therapy sessions.  Pt will ascend/descend 4 steps with 1 rail and CGA for safety.    Long Term Goals: 8 weeks (progressing, not met)  Pt will perform sit to stand from 20in seat height without UE support 5/5 trials.  Pt will perform standing balance with feet together x1 min for increased safety with standing activities.  Pt will increase R LE strength by 1 muscle grade in all deficient planes  in order to increase tolerance to functional activities and ADLs.  Pt will increase L LE strength by 1 muscle grade in all  deficient planes  in order to increase tolerance to functional activities and ADLs.  Pt will be independent with updated HEP to maintain gains following discharge with therapy.  Pt will ascend/descend ramp with rollator and supervision for safety.  Pt will ascend/descend 8 steps with 1 rail with SBA.    Plan     Continue PT towards established goals. Progress standing balance and LE strengthening as tolerated.  Transfer and safety training    Plan of care Certification: 5/9/2023 to 7/7/23.    Outpatient Physical Therapy 2 times weekly for 8 weeks to include the following interventions: Gait Training, Manual Therapy, Moist Heat/ Ice, Neuromuscular Re-ed, Patient Education, Self Care, Therapeutic Activities, and Therapeutic Exercise.     Yesi Gallegos, PTA

## 2023-05-19 ENCOUNTER — CLINICAL SUPPORT (OUTPATIENT)
Dept: REHABILITATION | Facility: HOSPITAL | Age: 83
End: 2023-05-19
Payer: MEDICARE

## 2023-05-19 DIAGNOSIS — R29.898 MUSCULAR DECONDITIONING: ICD-10-CM

## 2023-05-19 DIAGNOSIS — R26.89 IMPAIRMENT OF BALANCE: Primary | ICD-10-CM

## 2023-05-19 DIAGNOSIS — R26.89 DECREASED FUNCTIONAL MOBILITY: ICD-10-CM

## 2023-05-19 PROCEDURE — 97530 THERAPEUTIC ACTIVITIES: CPT | Mod: PN,CQ

## 2023-05-19 PROCEDURE — 97110 THERAPEUTIC EXERCISES: CPT | Mod: PN,CQ

## 2023-05-22 ENCOUNTER — CLINICAL SUPPORT (OUTPATIENT)
Dept: REHABILITATION | Facility: HOSPITAL | Age: 83
End: 2023-05-22
Payer: MEDICARE

## 2023-05-22 DIAGNOSIS — R26.89 IMPAIRMENT OF BALANCE: Primary | ICD-10-CM

## 2023-05-22 DIAGNOSIS — R29.898 MUSCULAR DECONDITIONING: ICD-10-CM

## 2023-05-22 DIAGNOSIS — R26.89 DECREASED FUNCTIONAL MOBILITY: ICD-10-CM

## 2023-05-22 PROCEDURE — 97110 THERAPEUTIC EXERCISES: CPT | Mod: PN,CQ

## 2023-05-22 PROCEDURE — 97530 THERAPEUTIC ACTIVITIES: CPT | Mod: PN,CQ

## 2023-05-22 PROCEDURE — 97112 NEUROMUSCULAR REEDUCATION: CPT | Mod: PN,CQ

## 2023-05-22 NOTE — PROGRESS NOTES
[facility]   Physical Therapy Treatment Note      Name: Tripp Myers  Clinic Number: [id number]    Therapy Diagnosis:   Encounter Diagnoses   Name Primary?    Impairment of balance Yes    Muscular deconditioning     Decreased functional mobility          Physician: Angel Hernandez*    Visit Date: 5/22/2023    Physician Orders: PT Eval and Treat   Medical Diagnosis from Referral:   R26.9 (ICD-10-CM) - Gait abnormality   R29.898 (ICD-10-CM) - Muscular deconditioning     Evaluation Date: 5/9/2023  Authorization Period Expiration: 12/31/23  Plan of Care Expiration: 7/7/23  Visit # / Visits authorized: 3/ 11 +eval    Time In: 10:04  Time Out: 10:49  Total Billable Time: 45 minutes    Precautions: Standard and pacemaker  PTA Visit #: 2/5       Subjective     Pt reports: doing pretty well today but does have some fatigue.  No falls since his last therapy session.   He was compliant with home exercise program.  Response to previous treatment: did well  Functional change: feels stairs are slightly easier to navigate    Pain: 0/10  Location:  NA     Objective      Objective Measures updated at progress report unless specified.     Treatment     Chad received the treatments listed below:      therapeutic exercises to develop strength and endurance for 15 minutes including:  Standing march in // bars CGA 2x10 ea  Standing hip abd in // bars CGA 2x10 ea  Standing hip ext  in // bars CGA 2x10 ea  Seated hip add ball x20, 5 sec hold    Not performed due to time:  Bridges x10, 5 sec hold  LAQ 2# x10 ea  Seated march x10  Seated hip abd GTB x10    May add: standing balance progressions    Chad participated in dynamic functional therapeutic activities to improve functional performance for 20   minutes, including:  Transfer to chair with rollator and CGA (vc for safety and sequencing)  Sit to stand from 18in arm chair + foam - CGA and arm rests (vc to scoot to edge of seat) 2x5  (NP) Sit to  stand from elevated mat x10 (vc for fwd trunk lean, glute set and upright posture) CGA  Pt ambulated outside with rollator.  He required min A descending ramp for balance and to guide rollator.    Pt required close supervision for transfer to car: SPT with rollator (vc for sequencing and safety).  Pt has to lift LE into car with UEs.  Emphasized importance of pt either coming to clinic with someone to assist him or call clinic to have PT assist him from car to clinic to prevent falls.  Pt gave verbal understanding.    neuromuscular re-education activities to improve: Balance, Sense, and Proprioception for 10 minutes. The following activities were included:  FTEO 2x30 sec with finger taps as needed  Modified tandem x30 sec ea finger taps and CGA as needed  Normal stance with reach to the side x 5 then reach to opposite side x 5 each, CGA  Normal stance with lateral weight shift x 30 sec, CGA  Normal stance overhead reach x 15 (R shoulder limited ROM), CGA, then x 10 with feet together    Patient Education and Home Exercises       Education provided:   - Emphasized importance of pt either coming to clinic with someone to assist him or call clinic to have PT assist him from car to clinic to prevent falls.  Pt gave verbal undertstanding.  -safety and sequencing with sit to stand transfers  Pt gave verbal understanding to all education provided     Written Home Exercises Provided: Patient instructed to cont prior HEP. Exercises were reviewed and Chad was able to demonstrate them prior to the end of the session.  Chad demonstrated good  understanding of the education provided. See EMR under Patient Instructions for exercises provided during therapy sessions    Assessment     Pt tolerated progression of treatment well with mod fatigue by end of session. Continues to require CGA throughout treatment session for balance, stabilization, and safety. Strength and endurance remain limited with prolonged standing exercises, VC  required for increased quad loading with several exercises. Supervision with gait to vehicle and CGA with placing walker in trunk.  He will continue to benefit from PT for balance, strength, endurance, and safety training for improved functional mobility.    Chad Is progressing well towards his goals.   Pt prognosis is Fair.     Pt will continue to benefit from skilled outpatient physical therapy to address the deficits listed in the problem list box on initial evaluation, provide pt/family education and to maximize pt's level of independence in the home and community environment.     Pt's spiritual, cultural and educational needs considered and pt agreeable to plan of care and goals.     Anticipated barriers to physical therapy: pt does not have consistent assistance to get to PT appts.    Goals: Short Term Goals:  4 weeks (progressing, not met)  Pt will perform sit to stand from 20in seat height without UE support 3/3 trials.  Pt will perform standing balance with feet together x 30 sec for increased safety with standing activities.  Pt will ambulate up/down ramp with rollator and SBA.  Pt will increase R LE strength by 1/3 muscle grade in all deficient planes for increased ease with ADLs and work activities.  Pt will increase L LE strength by 1/3 muscle grade in all deficient planes for increased ease with ADLs and work activities.  Pt will be independent and consistent with issued HEP in order to show carryover between therapy sessions.  Pt will ascend/descend 4 steps with 1 rail and CGA for safety.    Long Term Goals: 8 weeks (progressing, not met)  Pt will perform sit to stand from 20in seat height without UE support 5/5 trials.  Pt will perform standing balance with feet together x1 min for increased safety with standing activities.  Pt will increase R LE strength by 1 muscle grade in all deficient planes  in order to increase tolerance to functional activities and ADLs.  Pt will increase L LE strength by 1  muscle grade in all deficient planes  in order to increase tolerance to functional activities and ADLs.  Pt will be independent with updated HEP to maintain gains following discharge with therapy.  Pt will ascend/descend ramp with rollator and supervision for safety.  Pt will ascend/descend 8 steps with 1 rail with SBA.    Plan     Continue PT towards established goals. Progress standing balance and LE strengthening as tolerated.  Transfer and safety training    Plan of care Certification: 5/9/2023 to 7/7/23.    Outpatient Physical Therapy 2 times weekly for 8 weeks to include the following interventions: Gait Training, Manual Therapy, Moist Heat/ Ice, Neuromuscular Re-ed, Patient Education, Self Care, Therapeutic Activities, and Therapeutic Exercise.     Yesi Gallegos, PTA

## 2023-05-24 ENCOUNTER — CLINICAL SUPPORT (OUTPATIENT)
Dept: REHABILITATION | Facility: HOSPITAL | Age: 83
End: 2023-05-24
Payer: MEDICARE

## 2023-05-24 DIAGNOSIS — R29.898 MUSCULAR DECONDITIONING: ICD-10-CM

## 2023-05-24 DIAGNOSIS — R26.89 DECREASED FUNCTIONAL MOBILITY: ICD-10-CM

## 2023-05-24 DIAGNOSIS — R26.89 IMPAIRMENT OF BALANCE: Primary | ICD-10-CM

## 2023-05-24 PROCEDURE — 97110 THERAPEUTIC EXERCISES: CPT | Mod: PN,CQ

## 2023-05-24 PROCEDURE — 97530 THERAPEUTIC ACTIVITIES: CPT | Mod: PN,CQ

## 2023-05-24 PROCEDURE — 97112 NEUROMUSCULAR REEDUCATION: CPT | Mod: PN,CQ

## 2023-05-24 NOTE — PROGRESS NOTES
"[facility]   Physical Therapy Treatment Note      Name: Tripp Myers  Clinic Number: [id number]  Therapy Diagnosis:   Encounter Diagnoses   Name Primary?    Impairment of balance Yes    Muscular deconditioning     Decreased functional mobility        Physician: Angel Hernandez*    Visit Date: 5/24/2023    Physician Orders: PT Eval and Treat   Medical Diagnosis from Referral:   R26.9 (ICD-10-CM) - Gait abnormality   R29.898 (ICD-10-CM) - Muscular deconditioning     Evaluation Date: 5/9/2023  Authorization Period Expiration: 12/31/23  Plan of Care Expiration: 7/7/23  Visit # / Visits authorized: 3/ 11 +eval    Time In: 10:05  Time Out: 10:50  Total Billable Time: 45 minutes    Precautions: Standard and pacemaker  PTA Visit #: 3/5       Subjective     Pt reports: feeling okay currently, had some increased back pain this morning when getting up but improved with seated rest.   He was compliant with home exercise program.  Response to previous treatment: felt okay, some fatigue  Functional change: none since last session    Pain: 0/10  Location:  NA     Objective      Objective Measures updated at progress report unless specified.     Treatment     Chad received the treatments listed below:      therapeutic exercises to develop strength and endurance for 15 minutes including:  Standing march in // bars CGA x10 ea - cue to minimize weight bearing into hands on // bars   Progressed march to foot tap on 6" step x 10 each, cue for L LE lift, occasional tapping at L rectus femoris  Standing hip abd in // bars CGA 2x10 ea  Standing hip ext  in // bars CGA 2x10 ea  Seated hip add ball x20, 5 sec hold    Not performed due to time:  Bridges x10, 5 sec hold  LAQ 2# x10 ea  Seated march x10  Seated hip abd GTB x10    May add: standing balance progressions    neuromuscular re-education activities to improve: Balance, Sense, and Proprioception for 10 minutes. The following activities were " included:  FTEO 2x30 sec with finger taps as needed  Modified tandem x30 sec ea finger taps and CGA as needed  Normal stance with reach to the side x 5 then reach to opposite side x 5 each, CGA  Normal stance with lateral weight shift x 30 sec, CGA  Narrow stance overhead reach x 15 (R shoulder limited ROM), CGA    dynamic functional therapeutic activities to improve functional performance for 20  minutes, including:  Transfer to chair with rollator and CGA (vc for safety and sequencing)  Sit to stand from 18in arm chair + foam - CGA and arm rests (vc to scoot to edge of seat) 2x5  (NP) Sit to stand from elevated mat x10 (vc for fwd trunk lean, glute set and upright posture) CGA  Pt ambulated outside with rollator.  He required min A descending ramp for balance and to guide rollator.    Pt required close supervision for transfer to car: SPT with rollator (vc for sequencing and safety).  Pt has to lift LE into car with UEs.  Emphasized importance of pt either coming to clinic with someone to assist him or call clinic to have PT assist him from car to clinic to prevent falls.  Pt gave verbal understanding.      Patient Education and Home Exercises       Education provided:   - Emphasized importance of pt either coming to clinic with someone to assist him or call clinic to have PT assist him from car to clinic to prevent falls.  Pt gave verbal undertstanding.  -safety and sequencing with sit to stand transfers  Pt gave verbal understanding to all education provided     Written Home Exercises Provided: Patient instructed to cont prior HEP. Exercises were reviewed and Chad was able to demonstrate them prior to the end of the session.  Chad demonstrated good  understanding of the education provided. See EMR under Patient Instructions for exercises provided during therapy sessions    Assessment     Pt tolerated progression of exercises well this date though remains slow moving and requires frequent rest breaks. Decreased  "safety awareness with walker when transferring from standing activities to chair. Challenged primarily with balance activities, poor motor control of muscles with static standing and reaching. Impaired endurance with prolonged standing activities. Able to perform foot taps on 6" step though required mod/max verbal cues with L LE for proper lift and preventing circumduction movement patterns.  He will continue to benefit from PT for balance, strength, endurance, and safety training for improved functional mobility.    Chad Is progressing well towards his goals.   Pt prognosis is Fair.     Pt will continue to benefit from skilled outpatient physical therapy to address the deficits listed in the problem list box on initial evaluation, provide pt/family education and to maximize pt's level of independence in the home and community environment.     Pt's spiritual, cultural and educational needs considered and pt agreeable to plan of care and goals.     Anticipated barriers to physical therapy: pt does not have consistent assistance to get to PT appts.    Goals: Short Term Goals:  4 weeks (progressing, not met)  Pt will perform sit to stand from 20in seat height without UE support 3/3 trials.  Pt will perform standing balance with feet together x 30 sec for increased safety with standing activities.  Pt will ambulate up/down ramp with rollator and SBA.  Pt will increase R LE strength by 1/3 muscle grade in all deficient planes for increased ease with ADLs and work activities.  Pt will increase L LE strength by 1/3 muscle grade in all deficient planes for increased ease with ADLs and work activities.  Pt will be independent and consistent with issued HEP in order to show carryover between therapy sessions.  Pt will ascend/descend 4 steps with 1 rail and CGA for safety.    Long Term Goals: 8 weeks (progressing, not met)  Pt will perform sit to stand from 20in seat height without UE support 5/5 trials.  Pt will perform " standing balance with feet together x1 min for increased safety with standing activities.  Pt will increase R LE strength by 1 muscle grade in all deficient planes  in order to increase tolerance to functional activities and ADLs.  Pt will increase L LE strength by 1 muscle grade in all deficient planes  in order to increase tolerance to functional activities and ADLs.  Pt will be independent with updated HEP to maintain gains following discharge with therapy.  Pt will ascend/descend ramp with rollator and supervision for safety.  Pt will ascend/descend 8 steps with 1 rail with SBA.    Plan     Continue PT towards established goals. Progress standing balance and LE strengthening as tolerated.  Transfer and safety training    Plan of care Certification: 5/9/2023 to 7/7/23.    Outpatient Physical Therapy 2 times weekly for 8 weeks to include the following interventions: Gait Training, Manual Therapy, Moist Heat/ Ice, Neuromuscular Re-ed, Patient Education, Self Care, Therapeutic Activities, and Therapeutic Exercise.     Yesi Gallegos, PTA

## 2023-05-31 ENCOUNTER — OFFICE VISIT (OUTPATIENT)
Dept: UROLOGY | Facility: CLINIC | Age: 83
End: 2023-05-31
Payer: MEDICARE

## 2023-05-31 DIAGNOSIS — N40.1 ENLARGED PROSTATE WITH URINARY OBSTRUCTION: Primary | ICD-10-CM

## 2023-05-31 DIAGNOSIS — R33.9 URINARY RETENTION: ICD-10-CM

## 2023-05-31 DIAGNOSIS — N13.8 ENLARGED PROSTATE WITH URINARY OBSTRUCTION: Primary | ICD-10-CM

## 2023-05-31 PROCEDURE — 1101F PT FALLS ASSESS-DOCD LE1/YR: CPT | Mod: CPTII,S$GLB,, | Performed by: UROLOGY

## 2023-05-31 PROCEDURE — 3288F PR FALLS RISK ASSESSMENT DOCUMENTED: ICD-10-PCS | Mod: CPTII,S$GLB,, | Performed by: UROLOGY

## 2023-05-31 PROCEDURE — 1157F ADVNC CARE PLAN IN RCRD: CPT | Mod: CPTII,S$GLB,, | Performed by: UROLOGY

## 2023-05-31 PROCEDURE — 1101F PR PT FALLS ASSESS DOC 0-1 FALLS W/OUT INJ PAST YR: ICD-10-PCS | Mod: CPTII,S$GLB,, | Performed by: UROLOGY

## 2023-05-31 PROCEDURE — 99213 PR OFFICE/OUTPT VISIT, EST, LEVL III, 20-29 MIN: ICD-10-PCS | Mod: S$GLB,,, | Performed by: UROLOGY

## 2023-05-31 PROCEDURE — 1126F AMNT PAIN NOTED NONE PRSNT: CPT | Mod: CPTII,S$GLB,, | Performed by: UROLOGY

## 2023-05-31 PROCEDURE — 1126F PR PAIN SEVERITY QUANTIFIED, NO PAIN PRESENT: ICD-10-PCS | Mod: CPTII,S$GLB,, | Performed by: UROLOGY

## 2023-05-31 PROCEDURE — 1157F PR ADVANCE CARE PLAN OR EQUIV PRESENT IN MEDICAL RECORD: ICD-10-PCS | Mod: CPTII,S$GLB,, | Performed by: UROLOGY

## 2023-05-31 PROCEDURE — 99999 PR PBB SHADOW E&M-EST. PATIENT-LVL III: CPT | Mod: PBBFAC,,, | Performed by: UROLOGY

## 2023-05-31 PROCEDURE — 1159F MED LIST DOCD IN RCRD: CPT | Mod: CPTII,S$GLB,, | Performed by: UROLOGY

## 2023-05-31 PROCEDURE — 3288F FALL RISK ASSESSMENT DOCD: CPT | Mod: CPTII,S$GLB,, | Performed by: UROLOGY

## 2023-05-31 PROCEDURE — 1159F PR MEDICATION LIST DOCUMENTED IN MEDICAL RECORD: ICD-10-PCS | Mod: CPTII,S$GLB,, | Performed by: UROLOGY

## 2023-05-31 PROCEDURE — 99213 OFFICE O/P EST LOW 20 MIN: CPT | Mod: S$GLB,,, | Performed by: UROLOGY

## 2023-05-31 PROCEDURE — 99999 PR PBB SHADOW E&M-EST. PATIENT-LVL III: ICD-10-PCS | Mod: PBBFAC,,, | Performed by: UROLOGY

## 2023-05-31 RX ORDER — TAMSULOSIN HYDROCHLORIDE 0.4 MG/1
0.4 CAPSULE ORAL DAILY
Qty: 30 CAPSULE | Refills: 11 | Status: SHIPPED | OUTPATIENT
Start: 2023-05-31 | End: 2023-12-20 | Stop reason: SDUPTHER

## 2023-05-31 RX ORDER — AMLODIPINE BESYLATE 5 MG/1
5 TABLET ORAL DAILY
COMMUNITY
Start: 2023-01-29 | End: 2023-09-25 | Stop reason: ALTCHOICE

## 2023-05-31 RX ORDER — FUROSEMIDE 40 MG/1
TABLET ORAL
COMMUNITY
Start: 2023-05-10 | End: 2023-08-23

## 2023-05-31 NOTE — PROGRESS NOTES
Subjective:       Patient ID: Tripp Myers is a 82 y.o. male.    Chief Complaint: Other (Hospital f/u )    HPI    82-year-old with a history of BPH.  He underwent Urolift in 2018 and then repeated in 2020.  He is currently taking no BPH medications.  He was doing well but began having severe constipation and difficulty voiding.  He was seen in the emergency room 2 days ago.  Momin catheter was placed.  It is unclear how much was drained with the patient estimates about 800 mL.  He denies hematuria and dysuria.  Cystoscopy in December 2022 was unremarkable.    Review of Systems   Constitutional:  Negative for fever.   Genitourinary:  Negative for dysuria and hematuria.     Objective:      Physical Exam  Vitals reviewed.   Constitutional:       Appearance: He is well-developed.   Pulmonary:      Effort: Pulmonary effort is normal.   Skin:     Findings: No rash.   Neurological:      Mental Status: He is alert and oriented to person, place, and time.       Assessment:       1. Enlarged prostate with urinary obstruction    2. Urinary retention        Plan:       Enlarged prostate with urinary obstruction    Urinary retention    Other orders  -     tamsulosin (FLOMAX) 0.4 mg Cap; Take 1 capsule (0.4 mg total) by mouth once daily.  Dispense: 30 capsule; Refill: 11      Resume Flomax.  Momin out for voiding trial today.  Follow up next week for PVR

## 2023-06-05 ENCOUNTER — CLINICAL SUPPORT (OUTPATIENT)
Dept: REHABILITATION | Facility: HOSPITAL | Age: 83
End: 2023-06-05
Payer: MEDICARE

## 2023-06-05 DIAGNOSIS — R26.89 IMPAIRMENT OF BALANCE: Primary | ICD-10-CM

## 2023-06-05 DIAGNOSIS — R29.898 MUSCULAR DECONDITIONING: ICD-10-CM

## 2023-06-05 DIAGNOSIS — R26.89 DECREASED FUNCTIONAL MOBILITY: ICD-10-CM

## 2023-06-05 PROCEDURE — 97110 THERAPEUTIC EXERCISES: CPT | Mod: PN

## 2023-06-05 PROCEDURE — 97530 THERAPEUTIC ACTIVITIES: CPT | Mod: PN

## 2023-06-05 PROCEDURE — 97112 NEUROMUSCULAR REEDUCATION: CPT | Mod: PN

## 2023-06-05 NOTE — PROGRESS NOTES
"[facility]   Physical Therapy Treatment Note      Name: Tripp Myers  Clinic Number: [id number]  Therapy Diagnosis:   Encounter Diagnoses   Name Primary?    Impairment of balance Yes    Muscular deconditioning     Decreased functional mobility        Physician: Angel Heranndez*    Visit Date: 6/5/2023    Physician Orders: PT Eval and Treat   Medical Diagnosis from Referral:   R26.9 (ICD-10-CM) - Gait abnormality   R29.898 (ICD-10-CM) - Muscular deconditioning     Evaluation Date: 5/9/2023  Authorization Period Expiration: 12/31/23  Plan of Care Expiration: 7/7/23  Visit # / Visits authorized: 4/ 11 +eval    Time In: 10:03  Time Out: 10:50  Total Billable Time: 45 minutes    Precautions: Standard and pacemaker  PTA Visit #: 3/5       Subjective     Pt reports: he is feeling better this week and has been able to do HEP.  He states he tried to call and no one answered to so he walked from car to clinic on his own.  He was compliant with home exercise program.  Response to previous treatment: felt okay, some fatigue  Functional change: none since last session    Pain: 0/10  Location:  NA     Objective      Objective Measures updated at progress report unless specified.     Treatment     Chad received the treatments listed below:      therapeutic exercises to develop strength and endurance for 15 minutes including:  Standing march in // bars CGA x10 ea - cue to minimize weight bearing into hands on // bars   Progressed march to foot tap on 6" step x 10 each, cue for L LE lift, occasional tapping at L rectus femoris  Standing hip abd in // bars CGA 2x10 ea  Standing hip ext  in // bars CGA 2x10 ea  Seated hip add ball x20, 5 sec hold    Not performed due to time:  Bridges x10, 5 sec hold  LAQ 2# x10 ea  Seated march x10  Seated hip abd GTB x10    May add: standing balance progressions    neuromuscular re-education activities to improve: Balance, Sense, and Proprioception for 10 " minutes. The following activities were included:  FTEO 2x30 sec with finger taps as needed  Modified tandem x30 sec ea finger taps and CGA as needed  Normal stance with reach to the side x 5 then reach to opposite side x 5 each, CGA  Normal stance with lateral weight shift x 30 sec, CGA  Narrow stance overhead reach x 15 (R shoulder limited ROM), CGA  Modified Tandem balance 2x30 sec ea with CGA and finger taps as needed    dynamic functional therapeutic activities to improve functional performance for 20  minutes, including:  Transfer to chair with rollator and CGA (vc for safety and sequencing)  Sit to stand from 18in arm chair + foam - CGA and arm rests (vc to scoot to edge of seat) 2x5  Pt ambulated outside with rollator.  He required min A descending ramp for balance and to guide rollator.    Pt required close supervision for transfer to car: SPT with rollator (vc for sequencing and safety).  Pt has to lift LE into car with UEs.  Emphasized importance of pt either coming to clinic with someone to assist him or call clinic to have PT assist him from car to clinic to prevent falls.  Pt gave verbal understanding.      Patient Education and Home Exercises       Education provided:   - Emphasized importance of pt either coming to clinic with someone to assist him or call clinic to have PT assist him from car to clinic to prevent falls.  Pt gave verbal undertstanding.  -safety and sequencing with sit to stand transfers  Pt gave verbal understanding to all education provided     Written Home Exercises Provided: Patient instructed to cont prior HEP. Exercises were reviewed and Chad was able to demonstrate them prior to the end of the session.  Chad demonstrated good  understanding of the education provided. See EMR under Patient Instructions for exercises provided during therapy sessions    Assessment     Pt continues to have difficulty with L hip flexion for heel taps.  Some improvement with manual cues to hip flexor.   He had difficulty with weight shifts especially to L LE due to some knee buckling.  He continues to require cues for safety while ambulating on ramp and transferring to car.  He will continue to benefit from PT for balance, strength, endurance, and safety training for improved functional mobility.    Chad Is progressing well towards his goals.   Pt prognosis is Fair.     Pt will continue to benefit from skilled outpatient physical therapy to address the deficits listed in the problem list box on initial evaluation, provide pt/family education and to maximize pt's level of independence in the home and community environment.     Pt's spiritual, cultural and educational needs considered and pt agreeable to plan of care and goals.     Anticipated barriers to physical therapy: pt does not have consistent assistance to get to PT appts.    Goals: Short Term Goals:  4 weeks (progressing, not met)  Pt will perform sit to stand from 20in seat height without UE support 3/3 trials.  Pt will perform standing balance with feet together x 30 sec for increased safety with standing activities.  Pt will ambulate up/down ramp with rollator and SBA.  Pt will increase R LE strength by 1/3 muscle grade in all deficient planes for increased ease with ADLs and work activities.  Pt will increase L LE strength by 1/3 muscle grade in all deficient planes for increased ease with ADLs and work activities.  Pt will be independent and consistent with issued HEP in order to show carryover between therapy sessions.  Pt will ascend/descend 4 steps with 1 rail and CGA for safety.    Long Term Goals: 8 weeks (progressing, not met)  Pt will perform sit to stand from 20in seat height without UE support 5/5 trials.  Pt will perform standing balance with feet together x1 min for increased safety with standing activities.  Pt will increase R LE strength by 1 muscle grade in all deficient planes  in order to increase tolerance to functional activities and  ADLs.  Pt will increase L LE strength by 1 muscle grade in all deficient planes  in order to increase tolerance to functional activities and ADLs.  Pt will be independent with updated HEP to maintain gains following discharge with therapy.  Pt will ascend/descend ramp with rollator and supervision for safety.  Pt will ascend/descend 8 steps with 1 rail with SBA.    Plan     Continue PT towards established goals. Progress standing balance and LE strengthening as tolerated.  Transfer and safety training    Plan of care Certification: 5/9/2023 to 7/7/23.    Outpatient Physical Therapy 2 times weekly for 8 weeks to include the following interventions: Gait Training, Manual Therapy, Moist Heat/ Ice, Neuromuscular Re-ed, Patient Education, Self Care, Therapeutic Activities, and Therapeutic Exercise.     Peg Elizabeth, PT

## 2023-06-07 ENCOUNTER — CLINICAL SUPPORT (OUTPATIENT)
Dept: REHABILITATION | Facility: HOSPITAL | Age: 83
End: 2023-06-07
Payer: MEDICARE

## 2023-06-07 ENCOUNTER — CLINICAL SUPPORT (OUTPATIENT)
Dept: UROLOGY | Facility: CLINIC | Age: 83
End: 2023-06-07
Payer: MEDICARE

## 2023-06-07 DIAGNOSIS — R29.898 MUSCULAR DECONDITIONING: ICD-10-CM

## 2023-06-07 DIAGNOSIS — R33.9 URINARY RETENTION: Primary | ICD-10-CM

## 2023-06-07 DIAGNOSIS — R26.89 DECREASED FUNCTIONAL MOBILITY: ICD-10-CM

## 2023-06-07 DIAGNOSIS — R26.89 IMPAIRMENT OF BALANCE: Primary | ICD-10-CM

## 2023-06-07 LAB — POC RESIDUAL URINE VOLUME: 25 ML (ref 0–100)

## 2023-06-07 PROCEDURE — 97530 THERAPEUTIC ACTIVITIES: CPT | Mod: PN,CQ

## 2023-06-07 PROCEDURE — 97112 NEUROMUSCULAR REEDUCATION: CPT | Mod: PN,CQ

## 2023-06-07 PROCEDURE — 51798 POCT BLADDER SCAN: ICD-10-PCS | Mod: S$GLB,,, | Performed by: UROLOGY

## 2023-06-07 PROCEDURE — 97110 THERAPEUTIC EXERCISES: CPT | Mod: PN,CQ

## 2023-06-07 PROCEDURE — 51798 US URINE CAPACITY MEASURE: CPT | Mod: S$GLB,,, | Performed by: UROLOGY

## 2023-06-07 NOTE — PROGRESS NOTES
"[facility]   Physical Therapy Treatment Note      Name: Tripp Myers  Clinic Number: [id number]  Therapy Diagnosis:   Encounter Diagnoses   Name Primary?    Impairment of balance Yes    Muscular deconditioning     Decreased functional mobility          Physician: Angel Hernandez*    Visit Date: 6/7/2023    Physician Orders: PT Eval and Treat   Medical Diagnosis from Referral:   R26.9 (ICD-10-CM) - Gait abnormality   R29.898 (ICD-10-CM) - Muscular deconditioning     Evaluation Date: 5/9/2023  Authorization Period Expiration: 12/31/23  Plan of Care Expiration: 7/7/23  Visit # / Visits authorized: 5/ 11 +eval    Time In: 10:03  Time Out: 10:45  Total Billable Time: 42 minutes    Precautions: Standard and pacemaker    PTA Visit #: 1/5     Subjective     Pt reports: doing okay still, but feels like he may be getting fluid in his lungs again. Going to see his doctor in 2 weeks but may have to call sooner than later to be seen. Gets more fatigue with daily tasks such a dressing or walking.  He was compliant with home exercise program.  Response to previous treatment: felt okay, some fatigue  Functional change: none since last session    Pain: 0/10  Location:  NA     Objective      Objective Measures updated at progress report unless specified.     Treatment     Chad received the treatments listed below:      therapeutic exercises to develop strength and endurance for 15 minutes including:  Standing march in // bars CGA x10 ea - cue to minimize weight bearing into hands on // bars   Progressed march to foot tap on 6" step x 10 each, cue for L LE lift, tapping at L rectus femoris  Standing hip abd in // bars CGA 2x10 ea  Standing hip ext  in // bars CGA 2x10 ea  Seated hip add ball x20, 5 sec hold    Not performed due to time:  Bridges x10, 5 sec hold  LAQ 2# x10 ea  Seated march x10  Seated hip abd GTB x10    May add: standing balance progressions    neuromuscular re-education " activities to improve: Balance, Sense, and Proprioception for 10 minutes. The following activities were included:  FTEO 2x30 sec with finger taps as needed  Modified tandem x30 sec ea finger taps and CGA as needed  Normal stance with reach to the side x 5 then reach to opposite side x 5 each, CGA  Normal stance with lateral weight shift x 30 sec, CGA  Narrow stance overhead reach x 15 (R shoulder limited ROM), CGA    dynamic functional therapeutic activities to improve functional performance for 17 minutes, including:  Transfer to chair with rollator and CGA (vc for safety and sequencing)  Sit to stand from 18in arm chair + foam - CGA and arm rests (vc to scoot to edge of seat) 2x5  Pt ambulated outside with rollator.  He required min A descending ramp for balance and to guide rollator.    Pt required close supervision for transfer to car: SPT with rollator (vc for sequencing and safety).  Pt has to lift LE into car with UEs.  Emphasized importance of pt either coming to clinic with someone to assist him or call clinic to have PT assist him from car to clinic to prevent falls.  Pt gave verbal understanding.      Patient Education and Home Exercises       Education provided:   - Emphasized importance of pt either coming to clinic with someone to assist him or call clinic to have PT assist him from car to clinic to prevent falls.  Pt gave verbal undertstanding.  -safety and sequencing with sit to stand transfers  Pt gave verbal understanding to all education provided     Written Home Exercises Provided: Patient instructed to cont prior HEP. Exercises were reviewed and Chad was able to demonstrate them prior to the end of the session.  Chad demonstrated good  understanding of the education provided. See EMR under Patient Instructions for exercises provided during therapy sessions    Assessment     Pt presents to clinic with overall good tolerance to standing activities, continued challenge with sit to stands due to LE  weakness and impaired motor control. Improved independence and foot clearance with step taps this date with continued tapping to proximal hip flexor/TFL for activation. Also noted increased ease of lifting legs to get into vehicle. Close supervision when getting into vehicle and putting walker in his trunk. He continues to require cues for safety while ambulating on ramp and transferring to car.  He will continue to benefit from PT for balance, strength, endurance, and safety training for improved functional mobility.    Chad Is progressing well towards his goals.   Pt prognosis is Fair.     Pt will continue to benefit from skilled outpatient physical therapy to address the deficits listed in the problem list box on initial evaluation, provide pt/family education and to maximize pt's level of independence in the home and community environment.     Pt's spiritual, cultural and educational needs considered and pt agreeable to plan of care and goals.     Anticipated barriers to physical therapy: pt does not have consistent assistance to get to PT appts.    Goals: Short Term Goals:  4 weeks (progressing, not met)  Pt will perform sit to stand from 20in seat height without UE support 3/3 trials.  Pt will perform standing balance with feet together x 30 sec for increased safety with standing activities.  Pt will ambulate up/down ramp with rollator and SBA.  Pt will increase R LE strength by 1/3 muscle grade in all deficient planes for increased ease with ADLs and work activities.  Pt will increase L LE strength by 1/3 muscle grade in all deficient planes for increased ease with ADLs and work activities.  Pt will be independent and consistent with issued HEP in order to show carryover between therapy sessions.  Pt will ascend/descend 4 steps with 1 rail and CGA for safety.    Long Term Goals: 8 weeks (progressing, not met)  Pt will perform sit to stand from 20in seat height without UE support 5/5 trials.  Pt will perform  standing balance with feet together x1 min for increased safety with standing activities.  Pt will increase R LE strength by 1 muscle grade in all deficient planes  in order to increase tolerance to functional activities and ADLs.  Pt will increase L LE strength by 1 muscle grade in all deficient planes  in order to increase tolerance to functional activities and ADLs.  Pt will be independent with updated HEP to maintain gains following discharge with therapy.  Pt will ascend/descend ramp with rollator and supervision for safety.  Pt will ascend/descend 8 steps with 1 rail with SBA.    Plan     Continue PT towards established goals. Progress standing balance and LE strengthening as tolerated.  Transfer and safety training    Plan of care Certification: 5/9/2023 to 7/7/23.    Outpatient Physical Therapy 2 times weekly for 8 weeks to include the following interventions: Gait Training, Manual Therapy, Moist Heat/ Ice, Neuromuscular Re-ed, Patient Education, Self Care, Therapeutic Activities, and Therapeutic Exercise.     Yesi Gallegos, PTA

## 2023-06-12 DIAGNOSIS — M79.672 LEFT FOOT PAIN: Primary | ICD-10-CM

## 2023-06-13 ENCOUNTER — HOSPITAL ENCOUNTER (OUTPATIENT)
Dept: RADIOLOGY | Facility: HOSPITAL | Age: 83
Discharge: HOME | End: 2023-06-13
Attending: ORTHOPAEDIC SURGERY
Payer: MEDICARE

## 2023-06-13 ENCOUNTER — OFFICE VISIT (OUTPATIENT)
Dept: ORTHOPEDICS | Facility: CLINIC | Age: 83
End: 2023-06-13
Payer: MEDICARE

## 2023-06-13 DIAGNOSIS — S82.852A DISPLACED TRIMALLEOLAR FRACTURE OF LEFT LOWER LEG, CLOSED, INITIAL ENCOUNTER: Primary | ICD-10-CM

## 2023-06-13 DIAGNOSIS — M21.372 BILATERAL FOOT-DROP: ICD-10-CM

## 2023-06-13 DIAGNOSIS — M79.672 LEFT FOOT PAIN: ICD-10-CM

## 2023-06-13 DIAGNOSIS — M21.371 BILATERAL FOOT-DROP: ICD-10-CM

## 2023-06-13 PROCEDURE — 1159F MED LIST DOCD IN RCRD: CPT | Mod: CPTII,S$GLB,, | Performed by: ORTHOPAEDIC SURGERY

## 2023-06-13 PROCEDURE — 1157F PR ADVANCE CARE PLAN OR EQUIV PRESENT IN MEDICAL RECORD: ICD-10-PCS | Mod: CPTII,S$GLB,, | Performed by: ORTHOPAEDIC SURGERY

## 2023-06-13 PROCEDURE — 99204 PR OFFICE/OUTPT VISIT, NEW, LEVL IV, 45-59 MIN: ICD-10-PCS | Mod: S$GLB,,, | Performed by: ORTHOPAEDIC SURGERY

## 2023-06-13 PROCEDURE — 99999 PR PBB SHADOW E&M-EST. PATIENT-LVL I: ICD-10-PCS | Mod: PBBFAC,,, | Performed by: ORTHOPAEDIC SURGERY

## 2023-06-13 PROCEDURE — 1159F PR MEDICATION LIST DOCUMENTED IN MEDICAL RECORD: ICD-10-PCS | Mod: CPTII,S$GLB,, | Performed by: ORTHOPAEDIC SURGERY

## 2023-06-13 PROCEDURE — 1125F PR PAIN SEVERITY QUANTIFIED, PAIN PRESENT: ICD-10-PCS | Mod: CPTII,S$GLB,, | Performed by: ORTHOPAEDIC SURGERY

## 2023-06-13 PROCEDURE — 1157F ADVNC CARE PLAN IN RCRD: CPT | Mod: CPTII,S$GLB,, | Performed by: ORTHOPAEDIC SURGERY

## 2023-06-13 PROCEDURE — 1100F PR PT FALLS ASSESS DOC 2+ FALLS/FALL W/INJURY/YR: ICD-10-PCS | Mod: CPTII,S$GLB,, | Performed by: ORTHOPAEDIC SURGERY

## 2023-06-13 PROCEDURE — 73630 X-RAY EXAM OF FOOT: CPT | Mod: 26,LT,, | Performed by: RADIOLOGY

## 2023-06-13 PROCEDURE — 99999 PR PBB SHADOW E&M-EST. PATIENT-LVL I: CPT | Mod: PBBFAC,,, | Performed by: ORTHOPAEDIC SURGERY

## 2023-06-13 PROCEDURE — 73630 X-RAY EXAM OF FOOT: CPT | Mod: TC,PO,LT

## 2023-06-13 PROCEDURE — 73610 X-RAY EXAM OF ANKLE: CPT | Mod: 26,LT,, | Performed by: RADIOLOGY

## 2023-06-13 PROCEDURE — 1125F AMNT PAIN NOTED PAIN PRSNT: CPT | Mod: CPTII,S$GLB,, | Performed by: ORTHOPAEDIC SURGERY

## 2023-06-13 PROCEDURE — 1100F PTFALLS ASSESS-DOCD GE2>/YR: CPT | Mod: CPTII,S$GLB,, | Performed by: ORTHOPAEDIC SURGERY

## 2023-06-13 PROCEDURE — 99204 OFFICE O/P NEW MOD 45 MIN: CPT | Mod: S$GLB,,, | Performed by: ORTHOPAEDIC SURGERY

## 2023-06-13 PROCEDURE — 3288F PR FALLS RISK ASSESSMENT DOCUMENTED: ICD-10-PCS | Mod: CPTII,S$GLB,, | Performed by: ORTHOPAEDIC SURGERY

## 2023-06-13 PROCEDURE — 73610 XR ANKLE COMPLETE 3 VIEW LEFT: ICD-10-PCS | Mod: 26,LT,, | Performed by: RADIOLOGY

## 2023-06-13 PROCEDURE — 73610 X-RAY EXAM OF ANKLE: CPT | Mod: TC,PO,LT

## 2023-06-13 PROCEDURE — 3288F FALL RISK ASSESSMENT DOCD: CPT | Mod: CPTII,S$GLB,, | Performed by: ORTHOPAEDIC SURGERY

## 2023-06-13 PROCEDURE — 73630 XR FOOT COMPLETE 3 VIEW LEFT: ICD-10-PCS | Mod: 26,LT,, | Performed by: RADIOLOGY

## 2023-06-13 NOTE — PROGRESS NOTES
Status/Diagnosis: Displaced Left trimalleolar ankle fracture; Chronic BLE neuropathy; Chronic bilateral foot drop  Date of Surgery: none  Date of Injury: 06/10/2023  Return visit: 2 weeks  X-rays on Return: NWB 3-views Left ankle IN PLASTER    Chief Complaint:   Left ankle pain    Present History:  Tripp Myers is a 82 y.o. male who presents today for new patient evaluation.  He is accompanied by his family.  He does have a complex past medical history.    Endorses sustaining a ground level fall while getting out of the car on 06/10/2023.  Immediate left ankle pain, swelling, inability to bear weight.    Denies any left ankle pain prior to the above.    At baseline he is a home ambulator using a cane or walker at all times.   On questioning, he does have AFOs for chronic foot drop.  He was wearing at the time of injury.    Bilateral lower extremity edema.  History significant for atrial fibrillation status post pacemaker replacement on Eliquis; CAD status post stent placement; CHF; emphysema; hypertension; severe bilateral lower extremity peripheral neuropathy and chronic bilateral footdrop that patient relates to longstanding lumbar stenosis.      Past Medical History:   Diagnosis Date    Asthma     Atrial fibrillation     Cancer     Bladder CA    Cardiac pacemaker in situ 6/1/2010    9/15 Biotronik    Cardiomyopathy, ischemic 1/19/2023 12/22 Moderate left atrial enlargement. Mildly decreased systolic function. The estimated ejection fraction is 40%. Grade III left ventricular diastolic dysfunction. Normal right ventricular size with normal right ventricular systolic function. There is mild aortic valve stenosis. Aortic valve area is 1.30 cm2; peak velocity is 1.31 m/s; mean gradient is 4 mmHg. Mild mitral regurgitation. Mild tri    Coronary artery disease     Coronary artery disease involving native coronary artery of native heart without angina pectoris 3/15/2022    3/22 Left main coronary artery-normal  size vessel normal appearance. Left anterior descending-medium size vessel diffuse disease proximal mid up to proximally 45%.  First diagonal long branching vessel minimal disease less than 30%. Circumflex-normal size vessel long branching 1st obtuse marginal 100% occluded in its midportion with PETER 0 flow thereafter.  Second obtuse marginal distal circumflex    Diastolic CHF 5/16/2016    Hyperlipidemia 3/17/2011    Hypertension     Lumbar degenerative disc disease     MGUS (monoclonal gammopathy of unknown significance)     Neuropathy associated with MGUS     Nonrheumatic aortic valve stenosis 1/19/2023 12/212 Moderate left atrial enlargement. Mildly decreased systolic function. The estimated ejection fraction is 40%. Grade III left ventricular diastolic dysfunction. Normal right ventricular size with normal right ventricular systolic function. There is mild aortic valve stenosis. Aortic valve area is 1.30 cm2; peak velocity is 1.31 m/s; mean gradient is 4 mmHg. Mild mitral regurgitation. Mild tr    NSTEMI (non-ST elevated myocardial infarction)     Pacemaker     Paroxysmal atrial fibrillation 9/14/2015    S/P PTCA (percutaneous transluminal coronary angioplasty) - OM1 3/15/2022    3/22 IFR of the LAD was 0.93 IFR of the 2nd obtuse marginal 0.94. Intervention: Successful angioplasty of the midportion the 1st obtuse marginal using 2.0 mm balloon with PETER 1 flow.  Found have a 2nd occlusive lesions in the distal obtuse marginal underwent several angioplasties using 1.5 mm balloon up to 5 minute inflation restoring PETER 3 flow with less than 15% residual stenosis.  No stent pl       Past Surgical History:   Procedure Laterality Date    ADENOIDECTOMY      ATRIAL ABLATION SURGERY      CARDIAC CATHETERIZATION      CATARACT EXTRACTION W/  INTRAOCULAR LENS IMPLANT Bilateral     COLON SURGERY      colonoscopy    CORONARY ANGIOGRAPHY N/A 02/14/2022    Procedure: ANGIOGRAM, CORONARY ARTERY;  Surgeon: Hugh Gómez III,  MD;  Location: [facility] CATH;  Service: Cardiology;  Laterality: N/A;    EYE SURGERY      FRACTURE SURGERY      ORIF with hardware     INSERT / REPLACE / REMOVE PACEMAKER      LEFT HEART CATHETERIZATION Left 02/14/2022    Procedure: Left heart cath;  Surgeon: Hugh Gómez III, MD;  Location: [facility] CATH;  Service: Cardiology;  Laterality: Left;    LUMBAR EPIDURAL INJECTION      for spinal stenosis     PERCUTANEOUS TRANSLUMINAL BALLOON ANGIOPLASTY OF CORONARY ARTERY  03/05/2022    Procedure: Angioplasty-coronary;  Surgeon: Milton Jefferson MD;  Location: [facility] CATH;  Service: Cardiology;;    PROSTATE SURGERY      RETINAL DETACHMENT REPAIR W/ SCLERAL BUCKLE LE      TONSILLECTOMY         Current Outpatient Medications   Medication Sig    acetaminophen (TYLENOL) 500 MG tablet Take 500 mg by mouth every 6 (six) hours as needed for Pain.    allopurinoL (ZYLOPRIM) 100 MG tablet TAKE 1 TABLET(100 MG) BY MOUTH EVERY DAY    amiodarone (PACERONE) 200 MG Tab Take 1 tablet (200 mg total) by mouth once daily.    amLODIPine (NORVASC) 5 MG tablet Take 5 mg by mouth.    aspirin (ECOTRIN) 81 MG EC tablet Take 1 tablet (81 mg total) by mouth once daily.    azelastine (ASTELIN) 137 mcg (0.1 %) nasal spray USE 2 PUFFS IN EACH NOSTRIL BID PRN    cholecalciferol, vitamin D3, 125 mcg (5,000 unit) Tab Take 5,000 Units by mouth every other day.    cyanocobalamin (VITAMIN B-12) 1000 MCG tablet Take 100 mcg by mouth every other day.    diclofenac sodium (VOLTAREN) 1 % Gel APPLY TO THE AFFECTED AREA TWICE DAILY    ELIQUIS 5 mg Tab TAKE 1 TABLET(5 MG) BY MOUTH TWICE DAILY    FLOVENT  mcg/actuation inhaler Inhale 2 puffs into the lungs Daily.    fluticasone propionate (FLONASE) 50 mcg/actuation nasal spray 1 spray (50 mcg total) by Each Nostril route once daily.    furosemide (LASIX) 20 MG tablet Take 1 tablet (20 mg total) by mouth 2 (two) times a day.    furosemide (LASIX) 40 MG tablet     GLUCOSAMINE HCL/CHONDR GODOY A NA (OSTEO BI-FLEX ORAL)  "Take 1 tablet by mouth once daily.    HYDROcodone-acetaminophen (NORCO) 5-325 mg per tablet Take 1 tablet by mouth every 6 (six) hours as needed for Pain.    ketoconazole (NIZORAL) 2 % cream Apply topically once daily.    latanoprost 0.005 % dpem latanoprost 0.005 % eye drops   INSTILL 1 DROP INTO AFFECTED EYE(S) BY OPHTHALMIC ROUTE ONCE DAILY INTHE EVENING    levothyroxine (SYNTHROID) 25 MCG tablet Take 1 tablet (25 mcg total) by mouth before breakfast.    magnesium 250 mg Tab Take 250 mg by mouth every other day.    metOLazone (ZAROXOLYN) 2.5 MG tablet Take 1 tablet (2.5 mg total) by mouth twice a week. for 21 days    metoprolol succinate (TOPROL-XL) 100 MG 24 hr tablet Take 1 tablet (100 mg total) by mouth every 12 (twelve) hours.    MYRBETRIQ 50 mg Tb24 TAKE 1 TABLET(50 MG) BY MOUTH EVERY DAY    omeprazole (PRILOSEC) 40 MG capsule Take 1 capsule (40 mg total) by mouth once daily.    tamsulosin (FLOMAX) 0.4 mg Cap Take 1 capsule (0.4 mg total) by mouth once daily.    TRELEGY ELLIPTA 200-62.5-25 mcg inhaler Inhale 1 puff into the lungs once daily.    valsartan (DIOVAN) 80 MG tablet Take 1 tablet (80 mg total) by mouth once daily.    vitamin E 400 UNIT capsule Take 400 Units by mouth every other day.     No current facility-administered medications for this visit.       Review of patient's allergies indicates:   Allergen Reactions    Gabapentin Other (See Comments)     "Unknown"       Family History   Problem Relation Age of Onset    Cancer Mother     Diabetes Father     Alcohol abuse Father     Lumbar disc disease Sister        Social History     Socioeconomic History    Marital status:    Tobacco Use    Smoking status: Former     Packs/day: 1.00     Years: 20.00     Pack years: 20.00     Types: Cigarettes     Start date: 1954     Quit date: 1976     Years since quittin.4     Passive exposure: Never    Smokeless tobacco: Never    Tobacco comments:     quit at about 33 y/o   Substance and Sexual " Activity    Alcohol use: Not Currently     Alcohol/week: 2.0 standard drinks     Types: 2 Drinks containing 0.5 oz of alcohol per week     Comment: weekly    Drug use: No    Sexual activity: Not Currently     Partners: Female     Birth control/protection: None       Physical exam:  There were no vitals filed for this visit.  There is no height or weight on file to calculate BMI.  General: In no apparent distress; well developed and well nourished.  HEENT: normocephalic; atraumatic.  Cardiovascular: regular rate.  Respiratory: no increased work of breathing.  Musculoskeletal:   Gait: did not assess  Inspection:  Severe bilateral lower extremity edema.  Significant diffuse swelling ecchymosis involving the left ankle, lateral> medial.  Tenderness on deep palpation at both the medial and lateral malleolar fracture sites.  0/5 dorsiflexion strength bilaterally with attempted ankle dorsiflexion or toe extension.  3+/5 plantar flexion strength bilaterally.  Absent sensation on monofilament testing.  Weekly palpable pedal pulse.                     Imaging Studies/Outside documentation:  I have ordered/reviewed/interpreted the following images/outside documentation:  1. [facility]B 3-views of Left foot and ankle:  Displaced left trimalleolar ankle fracture.  Significant comminution at the lateral malleolar fracture site.  Severe diffuse osteopenic changes throughout the foot and ankle.        Assessment:  Tripp Myers is a 82 y.o. male with Displaced Left trimalleolar ankle fracture; Chronic BLE neuropathy; Chronic bilateral foot drop     Plan:   Clinical and radiographic findings were discussed at length with the patient and his family who accompanies him today.  Operative versus nonoperative treatment options were described.  Patient is high risk for surgical intervention.  Given his extensive list of medical comorbidities and overall low functional status, recommend nonoperative management to include patient placed into  a well-padded short-leg plaster splint.  He was to remain strict nonweightbearing left lower extremity.  May resume home anticoagulant dosing as he has discontinued this on his own since time of fracture.    Discussed the potential for nonunion/malunion/symptomatic posttraumatic arthritis.  Patient and family understand that should this become an issue, we will address accordingly in the future.  Return to clinic in 2 weeks for repeat evaluation and x-ray.      This note was created using voice recognition software and may contain grammatical errors.

## 2023-07-07 DIAGNOSIS — S82.852A DISPLACED TRIMALLEOLAR FRACTURE OF LEFT LOWER LEG, CLOSED, INITIAL ENCOUNTER: Primary | ICD-10-CM

## 2023-07-11 ENCOUNTER — HOSPITAL ENCOUNTER (OUTPATIENT)
Dept: RADIOLOGY | Facility: HOSPITAL | Age: 83
Discharge: HOME | End: 2023-07-11
Attending: ORTHOPAEDIC SURGERY
Payer: MEDICARE

## 2023-07-11 ENCOUNTER — OFFICE VISIT (OUTPATIENT)
Dept: ORTHOPEDICS | Facility: CLINIC | Age: 83
End: 2023-07-11
Payer: MEDICARE

## 2023-07-11 VITALS — HEIGHT: 70 IN | BODY MASS INDEX: 30.35 KG/M2 | WEIGHT: 212 LBS

## 2023-07-11 DIAGNOSIS — M21.371 BILATERAL FOOT-DROP: ICD-10-CM

## 2023-07-11 DIAGNOSIS — S82.852A DISPLACED TRIMALLEOLAR FRACTURE OF LEFT LOWER LEG, CLOSED, INITIAL ENCOUNTER: ICD-10-CM

## 2023-07-11 DIAGNOSIS — M21.372 BILATERAL FOOT-DROP: ICD-10-CM

## 2023-07-11 DIAGNOSIS — S82.852A DISPLACED TRIMALLEOLAR FRACTURE OF LEFT LOWER LEG, CLOSED, INITIAL ENCOUNTER: Primary | ICD-10-CM

## 2023-07-11 PROCEDURE — 99999 PR PBB SHADOW E&M-EST. PATIENT-LVL II: CPT | Mod: PBBFAC,,, | Performed by: ORTHOPAEDIC SURGERY

## 2023-07-11 PROCEDURE — 99213 PR OFFICE/OUTPT VISIT, EST, LEVL III, 20-29 MIN: ICD-10-PCS | Mod: S$GLB,,, | Performed by: ORTHOPAEDIC SURGERY

## 2023-07-11 PROCEDURE — 1157F ADVNC CARE PLAN IN RCRD: CPT | Mod: CPTII,S$GLB,, | Performed by: ORTHOPAEDIC SURGERY

## 2023-07-11 PROCEDURE — 99999 PR PBB SHADOW E&M-EST. PATIENT-LVL II: ICD-10-PCS | Mod: PBBFAC,,, | Performed by: ORTHOPAEDIC SURGERY

## 2023-07-11 PROCEDURE — 1159F MED LIST DOCD IN RCRD: CPT | Mod: CPTII,S$GLB,, | Performed by: ORTHOPAEDIC SURGERY

## 2023-07-11 PROCEDURE — 99213 OFFICE O/P EST LOW 20 MIN: CPT | Mod: S$GLB,,, | Performed by: ORTHOPAEDIC SURGERY

## 2023-07-11 PROCEDURE — 1160F PR REVIEW ALL MEDS BY PRESCRIBER/CLIN PHARMACIST DOCUMENTED: ICD-10-PCS | Mod: CPTII,S$GLB,, | Performed by: ORTHOPAEDIC SURGERY

## 2023-07-11 PROCEDURE — 1126F AMNT PAIN NOTED NONE PRSNT: CPT | Mod: CPTII,S$GLB,, | Performed by: ORTHOPAEDIC SURGERY

## 2023-07-11 PROCEDURE — 73610 XR ANKLE COMPLETE 3 VIEW LEFT: ICD-10-PCS | Mod: 26,LT,, | Performed by: RADIOLOGY

## 2023-07-11 PROCEDURE — 3288F PR FALLS RISK ASSESSMENT DOCUMENTED: ICD-10-PCS | Mod: CPTII,S$GLB,, | Performed by: ORTHOPAEDIC SURGERY

## 2023-07-11 PROCEDURE — 1157F PR ADVANCE CARE PLAN OR EQUIV PRESENT IN MEDICAL RECORD: ICD-10-PCS | Mod: CPTII,S$GLB,, | Performed by: ORTHOPAEDIC SURGERY

## 2023-07-11 PROCEDURE — 3288F FALL RISK ASSESSMENT DOCD: CPT | Mod: CPTII,S$GLB,, | Performed by: ORTHOPAEDIC SURGERY

## 2023-07-11 PROCEDURE — 1159F PR MEDICATION LIST DOCUMENTED IN MEDICAL RECORD: ICD-10-PCS | Mod: CPTII,S$GLB,, | Performed by: ORTHOPAEDIC SURGERY

## 2023-07-11 PROCEDURE — 1126F PR PAIN SEVERITY QUANTIFIED, NO PAIN PRESENT: ICD-10-PCS | Mod: CPTII,S$GLB,, | Performed by: ORTHOPAEDIC SURGERY

## 2023-07-11 PROCEDURE — 1101F PT FALLS ASSESS-DOCD LE1/YR: CPT | Mod: CPTII,S$GLB,, | Performed by: ORTHOPAEDIC SURGERY

## 2023-07-11 PROCEDURE — 73610 X-RAY EXAM OF ANKLE: CPT | Mod: TC,PO,LT

## 2023-07-11 PROCEDURE — 1101F PR PT FALLS ASSESS DOC 0-1 FALLS W/OUT INJ PAST YR: ICD-10-PCS | Mod: CPTII,S$GLB,, | Performed by: ORTHOPAEDIC SURGERY

## 2023-07-11 PROCEDURE — 73610 X-RAY EXAM OF ANKLE: CPT | Mod: 26,LT,, | Performed by: RADIOLOGY

## 2023-07-11 PROCEDURE — 1160F RVW MEDS BY RX/DR IN RCRD: CPT | Mod: CPTII,S$GLB,, | Performed by: ORTHOPAEDIC SURGERY

## 2023-07-11 NOTE — PROGRESS NOTES
Status/Diagnosis: Displaced Left trimalleolar ankle fracture; Chronic BLE neuropathy; Chronic bilateral foot drop  Date of Surgery: none  Date of Injury: 06/10/2023  Return visit: 3-4 weeks  X-rays on Return: [facility] 3-views Left ankle XOP    Chief Complaint:   Left ankle pain    Present History:  Tripp Myers is a 82 y.o. male who presents today for new patient evaluation.  He is accompanied by his family.  He does have a complex past medical history.    Endorses sustaining a ground level fall while getting out of the car on 06/10/2023.  Immediate left ankle pain, swelling, inability to bear weight.    Denies any left ankle pain prior to the above.    At baseline he is a home ambulator using a cane or walker at all times.   On questioning, he does have AFOs for chronic foot drop.  He was wearing at the time of injury.    Bilateral lower extremity edema.  History significant for atrial fibrillation status post pacemaker replacement on Eliquis; CAD status post stent placement; CHF; emphysema; hypertension; severe bilateral lower extremity peripheral neuropathy and chronic bilateral footdrop that patient relates to longstanding lumbar stenosis.    07/11/2023:  Patient returns today with his family.  Planned on seeing him 2 weeks ago however was unable to make this duty logistical issues.  Presents today with no pain, 0/10.  Reports compliance with nonweightbearing status.  He is accompanied by multiple family members.  Ambulating in a wheelchair.  As supportive shoe with AFO in place to contralateral lower leg.      Past Medical History:   Diagnosis Date    Asthma     Atrial fibrillation     Cancer     Bladder CA    Cardiac pacemaker in situ 6/1/2010    9/15 Biotronik    Cardiomyopathy, ischemic 1/19/2023 12/22 Moderate left atrial enlargement. Mildly decreased systolic function. The estimated ejection fraction is 40%. Grade III left ventricular diastolic dysfunction. Normal right ventricular size with normal  right ventricular systolic function. There is mild aortic valve stenosis. Aortic valve area is 1.30 cm2; peak velocity is 1.31 m/s; mean gradient is 4 mmHg. Mild mitral regurgitation. Mild tri    Coronary artery disease     Coronary artery disease involving native coronary artery of native heart without angina pectoris 3/15/2022    3/22 Left main coronary artery-normal size vessel normal appearance. Left anterior descending-medium size vessel diffuse disease proximal mid up to proximally 45%.  First diagonal long branching vessel minimal disease less than 30%. Circumflex-normal size vessel long branching 1st obtuse marginal 100% occluded in its midportion with PETER 0 flow thereafter.  Second obtuse marginal distal circumflex    Diastolic CHF 5/16/2016    Hyperlipidemia 3/17/2011    Hypertension     Lumbar degenerative disc disease     MGUS (monoclonal gammopathy of unknown significance)     Neuropathy associated with MGUS     Nonrheumatic aortic valve stenosis 1/19/2023 12/212 Moderate left atrial enlargement. Mildly decreased systolic function. The estimated ejection fraction is 40%. Grade III left ventricular diastolic dysfunction. Normal right ventricular size with normal right ventricular systolic function. There is mild aortic valve stenosis. Aortic valve area is 1.30 cm2; peak velocity is 1.31 m/s; mean gradient is 4 mmHg. Mild mitral regurgitation. Mild tr    NSTEMI (non-ST elevated myocardial infarction)     Pacemaker     Paroxysmal atrial fibrillation 9/14/2015    S/P PTCA (percutaneous transluminal coronary angioplasty) - OM1 3/15/2022    3/22 IFR of the LAD was 0.93 IFR of the 2nd obtuse marginal 0.94. Intervention: Successful angioplasty of the midportion the 1st obtuse marginal using 2.0 mm balloon with PETER 1 flow.  Found have a 2nd occlusive lesions in the distal obtuse marginal underwent several angioplasties using 1.5 mm balloon up to 5 minute inflation restoring PETER 3 flow with less than 15%  residual stenosis.  No stent pl       Past Surgical History:   Procedure Laterality Date    ADENOIDECTOMY      ATRIAL ABLATION SURGERY      CARDIAC CATHETERIZATION      CATARACT EXTRACTION W/  INTRAOCULAR LENS IMPLANT Bilateral     COLON SURGERY      colonoscopy    CORONARY ANGIOGRAPHY N/A 02/14/2022    Procedure: ANGIOGRAM, CORONARY ARTERY;  Surgeon: Hugh Gómez III, MD;  Location: [facility] CATH;  Service: Cardiology;  Laterality: N/A;    EYE SURGERY      FRACTURE SURGERY      ORIF with hardware     INSERT / REPLACE / REMOVE PACEMAKER      LEFT HEART CATHETERIZATION Left 02/14/2022    Procedure: Left heart cath;  Surgeon: Hugh Gómez III, MD;  [facility]ATH;  Service: Cardiology;  Laterality: Left;    LUMBAR EPIDURAL INJECTION      for spinal stenosis     PERCUTANEOUS TRANSLUMINAL BALLOON ANGIOPLASTY OF CORONARY ARTERY  03/05/2022    Procedure: Angioplasty-coronary;  Surgeon: Milton Jefferson MD;  Location: [facility]ATH;  Service: Cardiology;;    PROSTATE SURGERY      RETINAL DETACHMENT REPAIR W/ SCLERAL BUCKLE LE      TONSILLECTOMY         Current Outpatient Medications   Medication Sig    acetaminophen (TYLENOL) 500 MG tablet Take 500 mg by mouth every 6 (six) hours as needed for Pain.    allopurinoL (ZYLOPRIM) 100 MG tablet TAKE 1 TABLET(100 MG) BY MOUTH EVERY DAY    amiodarone (PACERONE) 200 MG Tab Take 1 tablet (200 mg total) by mouth once daily.    amLODIPine (NORVASC) 5 MG tablet Take 5 mg by mouth.    aspirin (ECOTRIN) 81 MG EC tablet Take 1 tablet (81 mg total) by mouth once daily.    azelastine (ASTELIN) 137 mcg (0.1 %) nasal spray USE 2 PUFFS IN EACH NOSTRIL BID PRN    cholecalciferol, vitamin D3, 125 mcg (5,000 unit) Tab Take 5,000 Units by mouth every other day.    cyanocobalamin (VITAMIN B-12) 1000 MCG tablet Take 100 mcg by mouth every other day.    diclofenac sodium (VOLTAREN) 1 % Gel APPLY TO THE AFFECTED AREA TWICE DAILY    ELIQUIS 5 mg Tab TAKE 1 TABLET(5 MG) BY MOUTH TWICE DAILY    FLOVENT  " mcg/actuation inhaler Inhale 2 puffs into the lungs Daily.    fluticasone propionate (FLONASE) 50 mcg/actuation nasal spray 1 spray (50 mcg total) by Each Nostril route once daily.    furosemide (LASIX) 20 MG tablet Take 1 tablet (20 mg total) by mouth 2 (two) times a day.    furosemide (LASIX) 40 MG tablet     GLUCOSAMINE HCL/CHONDR GODOY A NA (OSTEO BI-FLEX ORAL) Take 1 tablet by mouth once daily.    HYDROcodone-acetaminophen (NORCO) 5-325 mg per tablet Take 1 tablet by mouth every 6 (six) hours as needed for Pain.    ketoconazole (NIZORAL) 2 % cream Apply topically once daily.    latanoprost 0.005 % dpem latanoprost 0.005 % eye drops   INSTILL 1 DROP INTO AFFECTED EYE(S) BY OPHTHALMIC ROUTE ONCE DAILY INTHE EVENING    levothyroxine (SYNTHROID) 25 MCG tablet Take 1 tablet (25 mcg total) by mouth before breakfast.    magnesium 250 mg Tab Take 250 mg by mouth every other day.    metOLazone (ZAROXOLYN) 2.5 MG tablet Take 1 tablet (2.5 mg total) by mouth twice a week. for 21 days    metoprolol succinate (TOPROL-XL) 100 MG 24 hr tablet Take 1 tablet (100 mg total) by mouth every 12 (twelve) hours.    MYRBETRIQ 50 mg Tb24 TAKE 1 TABLET(50 MG) BY MOUTH EVERY DAY    omeprazole (PRILOSEC) 40 MG capsule Take 1 capsule (40 mg total) by mouth once daily.    tamsulosin (FLOMAX) 0.4 mg Cap Take 1 capsule (0.4 mg total) by mouth once daily.    TRELEGY ELLIPTA 200-62.5-25 mcg inhaler Inhale 1 puff into the lungs once daily.    valsartan (DIOVAN) 80 MG tablet Take 1 tablet (80 mg total) by mouth once daily.    vitamin E 400 UNIT capsule Take 400 Units by mouth every other day.     No current facility-administered medications for this visit.       Review of patient's allergies indicates:   Allergen Reactions    Gabapentin Other (See Comments)     "Unknown"       Family History   Problem Relation Age of Onset    Cancer Mother     Diabetes Father     Alcohol abuse Father     Lumbar disc disease Sister        Social History "     Socioeconomic History    Marital status:    Tobacco Use    Smoking status: Former     Packs/day: 1.00     Years: 20.00     Pack years: 20.00     Types: Cigarettes     Start date: 1954     Quit date: 1976     Years since quittin.5     Passive exposure: Never    Smokeless tobacco: Never    Tobacco comments:     quit at about 33 y/o   Substance and Sexual Activity    Alcohol use: Not Currently     Alcohol/week: 2.0 standard drinks     Types: 2 Drinks containing 0.5 oz of alcohol per week     Comment: weekly    Drug use: No    Sexual activity: Not Currently     Partners: Female     Birth control/protection: None       Physical exam:  There were no vitals filed for this visit.  Body mass index is 30.42 kg/m².  General: In no apparent distress; well developed and well nourished.  HEENT: normocephalic; atraumatic.  Cardiovascular: regular rate.  Respiratory: no increased work of breathing.  Musculoskeletal:   Gait: did not assess  Inspection:  Severe bilateral lower extremity edema, somewhat improved bilaterally but still with diffuse left ankle swelling.  No residual ecchymosis..  Mild residual tenderness on deep palpation at both the medial and lateral malleolar fracture sites.  0/5 dorsiflexion strength bilaterally with attempted ankle dorsiflexion or toe extension.  3+/5 plantar flexion strength bilaterally.  Absent sensation on monofilament testing.  Weekly palpable pedal pulse.                     Imaging Studies/Outside documentation:  I have ordered/reviewed/interpreted the following images/outside documentation:  1. [facility] 3-views of Left ankle: persistent displaced left trimalleolar ankle fracture.  Again note significant comminution at the lateral malleolar fracture site.  Severe diffuse osteopenic changes throughout the foot and ankle.  Overall alignment well-maintained -- unchanged versus new patient evaluation on .        Assessment:  Tripp Myers is a 82 y.o. male with  Displaced Left trimalleolar ankle fracture; Chronic BLE neuropathy; Chronic bilateral foot drop     Plan:   Clinical and radiographic findings were discussed at length with the patient and his family who accompanies him today.  Operative versus nonoperative treatment options were described.  Patient is high risk for surgical intervention.  Given his extensive list of medical comorbidities and overall low functional status, recommend nonoperative management to include patient placed into a well-padded short-leg plaster splint.  He was to remain strict nonweightbearing left lower extremity.  May resume home anticoagulant dosing as he has discontinued this on his own since time of fracture.    Discussed the potential for nonunion/malunion/symptomatic posttraumatic arthritis.  Patient and family understand that should this become an issue, we will address accordingly in the future.  Return to clinic in 2 weeks for repeat evaluation and x-ray.    07/11/2023:  Clinical and radiographic findings were again discussed today.  We will plan on continued conservative management.  Patient to be placed back into a well-padded short-leg plaster splint.  Continue strict nonweightbearing left lower extremity.  Return to clinic in 3-4 weeks for repeat evaluation and x-ray XOP.  Pending patient progress and interval fracture healing on x-ray, possible initiation of slow progressive weight-bearing in a tall boot at that time.  Patient and family voiced understanding.  All questions were answered.      This note was created using voice recognition software and may contain grammatical errors.

## 2023-07-31 DIAGNOSIS — S82.852A DISPLACED TRIMALLEOLAR FRACTURE OF LEFT LOWER LEG, CLOSED, INITIAL ENCOUNTER: Primary | ICD-10-CM

## 2023-08-03 ENCOUNTER — OFFICE VISIT (OUTPATIENT)
Dept: ORTHOPEDICS | Facility: CLINIC | Age: 83
End: 2023-08-03
Payer: MEDICARE

## 2023-08-03 ENCOUNTER — HOSPITAL ENCOUNTER (OUTPATIENT)
Dept: RADIOLOGY | Facility: HOSPITAL | Age: 83
Discharge: HOME | End: 2023-08-03
Attending: ORTHOPAEDIC SURGERY
Payer: MEDICARE

## 2023-08-03 VITALS — HEART RATE: 75 BPM | OXYGEN SATURATION: 97 % | SYSTOLIC BLOOD PRESSURE: 82 MMHG | DIASTOLIC BLOOD PRESSURE: 58 MMHG

## 2023-08-03 DIAGNOSIS — S82.852A DISPLACED TRIMALLEOLAR FRACTURE OF LEFT LOWER LEG, CLOSED, INITIAL ENCOUNTER: Primary | ICD-10-CM

## 2023-08-03 DIAGNOSIS — S82.852A DISPLACED TRIMALLEOLAR FRACTURE OF LEFT LOWER LEG, CLOSED, INITIAL ENCOUNTER: ICD-10-CM

## 2023-08-03 PROCEDURE — 1160F PR REVIEW ALL MEDS BY PRESCRIBER/CLIN PHARMACIST DOCUMENTED: ICD-10-PCS | Mod: CPTII,S$GLB,, | Performed by: ORTHOPAEDIC SURGERY

## 2023-08-03 PROCEDURE — 99999 PR PBB SHADOW E&M-EST. PATIENT-LVL I: ICD-10-PCS | Mod: PBBFAC,,, | Performed by: ORTHOPAEDIC SURGERY

## 2023-08-03 PROCEDURE — 99213 OFFICE O/P EST LOW 20 MIN: CPT | Mod: S$GLB,,, | Performed by: ORTHOPAEDIC SURGERY

## 2023-08-03 PROCEDURE — 1159F PR MEDICATION LIST DOCUMENTED IN MEDICAL RECORD: ICD-10-PCS | Mod: CPTII,S$GLB,, | Performed by: ORTHOPAEDIC SURGERY

## 2023-08-03 PROCEDURE — 1160F RVW MEDS BY RX/DR IN RCRD: CPT | Mod: CPTII,S$GLB,, | Performed by: ORTHOPAEDIC SURGERY

## 2023-08-03 PROCEDURE — 1126F AMNT PAIN NOTED NONE PRSNT: CPT | Mod: CPTII,S$GLB,, | Performed by: ORTHOPAEDIC SURGERY

## 2023-08-03 PROCEDURE — 1126F PR PAIN SEVERITY QUANTIFIED, NO PAIN PRESENT: ICD-10-PCS | Mod: CPTII,S$GLB,, | Performed by: ORTHOPAEDIC SURGERY

## 2023-08-03 PROCEDURE — 1157F ADVNC CARE PLAN IN RCRD: CPT | Mod: CPTII,S$GLB,, | Performed by: ORTHOPAEDIC SURGERY

## 2023-08-03 PROCEDURE — 3288F FALL RISK ASSESSMENT DOCD: CPT | Mod: CPTII,S$GLB,, | Performed by: ORTHOPAEDIC SURGERY

## 2023-08-03 PROCEDURE — 1159F MED LIST DOCD IN RCRD: CPT | Mod: CPTII,S$GLB,, | Performed by: ORTHOPAEDIC SURGERY

## 2023-08-03 PROCEDURE — 1101F PT FALLS ASSESS-DOCD LE1/YR: CPT | Mod: CPTII,S$GLB,, | Performed by: ORTHOPAEDIC SURGERY

## 2023-08-03 PROCEDURE — 73610 X-RAY EXAM OF ANKLE: CPT | Mod: TC,PO,LT

## 2023-08-03 PROCEDURE — 3288F PR FALLS RISK ASSESSMENT DOCUMENTED: ICD-10-PCS | Mod: CPTII,S$GLB,, | Performed by: ORTHOPAEDIC SURGERY

## 2023-08-03 PROCEDURE — 99213 PR OFFICE/OUTPT VISIT, EST, LEVL III, 20-29 MIN: ICD-10-PCS | Mod: S$GLB,,, | Performed by: ORTHOPAEDIC SURGERY

## 2023-08-03 PROCEDURE — 99999 PR PBB SHADOW E&M-EST. PATIENT-LVL I: CPT | Mod: PBBFAC,,, | Performed by: ORTHOPAEDIC SURGERY

## 2023-08-03 PROCEDURE — 1157F PR ADVANCE CARE PLAN OR EQUIV PRESENT IN MEDICAL RECORD: ICD-10-PCS | Mod: CPTII,S$GLB,, | Performed by: ORTHOPAEDIC SURGERY

## 2023-08-03 PROCEDURE — 1101F PR PT FALLS ASSESS DOC 0-1 FALLS W/OUT INJ PAST YR: ICD-10-PCS | Mod: CPTII,S$GLB,, | Performed by: ORTHOPAEDIC SURGERY

## 2023-08-03 PROCEDURE — 73610 X-RAY EXAM OF ANKLE: CPT | Mod: 26,LT,, | Performed by: RADIOLOGY

## 2023-08-03 PROCEDURE — 73610 XR ANKLE COMPLETE 3 VIEW LEFT: ICD-10-PCS | Mod: 26,LT,, | Performed by: RADIOLOGY

## 2023-08-03 NOTE — PROGRESS NOTES
Status/Diagnosis: Displaced Left trimalleolar ankle fracture; Chronic BLE neuropathy; Chronic bilateral foot drop  Date of Surgery: none  Date of Injury: 06/10/2023  Return visit: 08/29/2023  X-rays on Return: [facility] 3-views Left ankle XOP    Chief Complaint:   Left ankle pain    Present History:  Tripp Myers is a 82 y.o. male who presents today for new patient evaluation.  He is accompanied by his family.  He does have a complex past medical history.    Endorses sustaining a ground level fall while getting out of the car on 06/10/2023.  Immediate left ankle pain, swelling, inability to bear weight.    Denies any left ankle pain prior to the above.    At baseline he is a home ambulator using a cane or walker at all times.   On questioning, he does have AFOs for chronic foot drop.  He was wearing at the time of injury.    Bilateral lower extremity edema.  History significant for atrial fibrillation status post pacemaker replacement on Eliquis; CAD status post stent placement; CHF; emphysema; hypertension; severe bilateral lower extremity peripheral neuropathy and chronic bilateral footdrop that patient relates to longstanding lumbar stenosis.    07/11/2023:  Patient returns today with his family.  Planned on seeing him 2 weeks ago however was unable to make this duty logistical issues.  Presents today with no pain, 0/10.  Reports compliance with nonweightbearing status.  He is accompanied by multiple family members.  Ambulating in a wheelchair.  As supportive shoe with AFO in place to contralateral lower leg.    08/03/2023:  Returns today with his family for repeat clinical evaluation.  Overall doing well.  Endorses 0/10 pain.  Reports compliance with nonweightbearing status.  Continues to ambulate in a wheelchair.      Past Medical History:   Diagnosis Date    Asthma     Atrial fibrillation     Cancer     Bladder CA    Cardiac pacemaker in situ 6/1/2010    9/15 Biotronik    Cardiomyopathy, ischemic 1/19/2023     12/22 Moderate left atrial enlargement. Mildly decreased systolic function. The estimated ejection fraction is 40%. Grade III left ventricular diastolic dysfunction. Normal right ventricular size with normal right ventricular systolic function. There is mild aortic valve stenosis. Aortic valve area is 1.30 cm2; peak velocity is 1.31 m/s; mean gradient is 4 mmHg. Mild mitral regurgitation. Mild tri    Coronary artery disease     Coronary artery disease involving native coronary artery of native heart without angina pectoris 3/15/2022    3/22 Left main coronary artery-normal size vessel normal appearance. Left anterior descending-medium size vessel diffuse disease proximal mid up to proximally 45%.  First diagonal long branching vessel minimal disease less than 30%. Circumflex-normal size vessel long branching 1st obtuse marginal 100% occluded in its midportion with PETER 0 flow thereafter.  Second obtuse marginal distal circumflex    Diastolic CHF 5/16/2016    Hyperlipidemia 3/17/2011    Hypertension     Lumbar degenerative disc disease     MGUS (monoclonal gammopathy of unknown significance)     Neuropathy associated with MGUS     Nonrheumatic aortic valve stenosis 1/19/2023 12/212 Moderate left atrial enlargement. Mildly decreased systolic function. The estimated ejection fraction is 40%. Grade III left ventricular diastolic dysfunction. Normal right ventricular size with normal right ventricular systolic function. There is mild aortic valve stenosis. Aortic valve area is 1.30 cm2; peak velocity is 1.31 m/s; mean gradient is 4 mmHg. Mild mitral regurgitation. Mild tr    NSTEMI (non-ST elevated myocardial infarction)     Pacemaker     Paroxysmal atrial fibrillation 9/14/2015    S/P PTCA (percutaneous transluminal coronary angioplasty) - OM1 3/15/2022    3/22 IFR of the LAD was 0.93 IFR of the 2nd obtuse marginal 0.94. Intervention: Successful angioplasty of the midportion the 1st obtuse marginal using 2.0 mm  balloon with PETER 1 flow.  Found have a 2nd occlusive lesions in the distal obtuse marginal underwent several angioplasties using 1.5 mm balloon up to 5 minute inflation restoring PETER 3 flow with less than 15% residual stenosis.  No stent pl       Past Surgical History:   Procedure Laterality Date    ADENOIDECTOMY      ATRIAL ABLATION SURGERY      CARDIAC CATHETERIZATION      CATARACT EXTRACTION W/  INTRAOCULAR LENS IMPLANT Bilateral     COLON SURGERY      colonoscopy    CORONARY ANGIOGRAPHY N/A 02/14/2022    Procedure: ANGIOGRAM, CORONARY ARTERY;  Surgeon: Hugh Gómez III, MD;  Location: [facility] CATH;  Service: Cardiology;  Laterality: N/A;    EYE SURGERY      FRACTURE SURGERY      ORIF with hardware     INSERT / REPLACE / REMOVE PACEMAKER      LEFT HEART CATHETERIZATION Left 02/14/2022    Procedure: Left heart cath;  Surgeon: Hugh Gómez III, MD;  Location: [facility] CATH;  Service: Cardiology;  Laterality: Left;    LUMBAR EPIDURAL INJECTION      for spinal stenosis     PERCUTANEOUS TRANSLUMINAL BALLOON ANGIOPLASTY OF CORONARY ARTERY  03/05/2022    Procedure: Angioplasty-coronary;  Surgeon: Milton Jefferson MD;  Location: [facility]ATH;  Service: Cardiology;;    PROSTATE SURGERY      RETINAL DETACHMENT REPAIR W/ SCLERAL BUCKLE LE      TONSILLECTOMY         Current Outpatient Medications   Medication Sig    acetaminophen (TYLENOL) 500 MG tablet Take 500 mg by mouth every 6 (six) hours as needed for Pain.    allopurinoL (ZYLOPRIM) 100 MG tablet TAKE 1 TABLET(100 MG) BY MOUTH EVERY DAY    amiodarone (PACERONE) 200 MG Tab Take 1 tablet (200 mg total) by mouth once daily.    amLODIPine (NORVASC) 5 MG tablet Take 5 mg by mouth.    aspirin (ECOTRIN) 81 MG EC tablet Take 1 tablet (81 mg total) by mouth once daily.    azelastine (ASTELIN) 137 mcg (0.1 %) nasal spray USE 2 PUFFS IN EACH NOSTRIL BID PRN    cholecalciferol, vitamin D3, 125 mcg (5,000 unit) Tab Take 5,000 Units by mouth every other day.    cyanocobalamin (VITAMIN  B-12) 1000 MCG tablet Take 100 mcg by mouth every other day.    diclofenac sodium (VOLTAREN) 1 % Gel APPLY TO THE AFFECTED AREA TWICE DAILY    ELIQUIS 5 mg Tab TAKE 1 TABLET(5 MG) BY MOUTH TWICE DAILY    FLOVENT  mcg/actuation inhaler Inhale 2 puffs into the lungs Daily.    fluticasone propionate (FLONASE) 50 mcg/actuation nasal spray 1 spray (50 mcg total) by Each Nostril route once daily.    furosemide (LASIX) 20 MG tablet Take 1 tablet (20 mg total) by mouth 2 (two) times a day.    furosemide (LASIX) 40 MG tablet     GLUCOSAMINE HCL/CHONDR GODOY A NA (OSTEO BI-FLEX ORAL) Take 1 tablet by mouth once daily.    HYDROcodone-acetaminophen (NORCO) 5-325 mg per tablet Take 1 tablet by mouth every 6 (six) hours as needed for Pain.    ketoconazole (NIZORAL) 2 % cream Apply topically once daily.    latanoprost 0.005 % dpem latanoprost 0.005 % eye drops   INSTILL 1 DROP INTO AFFECTED EYE(S) BY OPHTHALMIC ROUTE ONCE DAILY INTHE EVENING    levothyroxine (SYNTHROID) 25 MCG tablet Take 1 tablet (25 mcg total) by mouth before breakfast.    magnesium 250 mg Tab Take 250 mg by mouth every other day.    metoprolol succinate (TOPROL-XL) 100 MG 24 hr tablet Take 1 tablet (100 mg total) by mouth every 12 (twelve) hours.    MYRBETRIQ 50 mg Tb24 TAKE 1 TABLET(50 MG) BY MOUTH EVERY DAY    omeprazole (PRILOSEC) 40 MG capsule Take 1 capsule (40 mg total) by mouth once daily.    tamsulosin (FLOMAX) 0.4 mg Cap Take 1 capsule (0.4 mg total) by mouth once daily.    TRELEGY ELLIPTA 200-62.5-25 mcg inhaler Inhale 1 puff into the lungs once daily.    valsartan (DIOVAN) 80 MG tablet Take 1 tablet (80 mg total) by mouth once daily.    vitamin E 400 UNIT capsule Take 400 Units by mouth every other day.    metOLazone (ZAROXOLYN) 2.5 MG tablet Take 1 tablet (2.5 mg total) by mouth twice a week. for 21 days     No current facility-administered medications for this visit.       Review of patient's allergies indicates:   Allergen Reactions     "Gabapentin Other (See Comments)     "Unknown"       Family History   Problem Relation Age of Onset    Cancer Mother     Diabetes Father     Alcohol abuse Father     Lumbar disc disease Sister        Social History     Socioeconomic History    Marital status:    Tobacco Use    Smoking status: Former     Current packs/day: 0.00     Average packs/day: 1 pack/day for 22.0 years (22.0 ttl pk-yrs)     Types: Cigarettes     Start date: 1954     Quit date: 1976     Years since quittin.6     Passive exposure: Never    Smokeless tobacco: Never    Tobacco comments:     quit at about 33 y/o   Substance and Sexual Activity    Alcohol use: Not Currently     Alcohol/week: 2.0 standard drinks of alcohol     Types: 2 Drinks containing 0.5 oz of alcohol per week     Comment: weekly    Drug use: No    Sexual activity: Not Currently     Partners: Female     Birth control/protection: None     Social Determinants of Health     Financial Resource Strain: Low Risk  (10/6/2020)    Overall Financial Resource Strain (CARDIA)     Difficulty of Paying Living Expenses: Not hard at all   Food Insecurity: No Food Insecurity (10/6/2020)    Hunger Vital Sign     Worried About Running Out of Food in the Last Year: Never true     Ran Out of Food in the Last Year: Never true   Transportation Needs: No Transportation Needs (2023)    OASIS : Transportation     Lack of Transportation (Medical): No     Lack of Transportation (Non-Medical): No     Patient Unable or Declines to Respond: No   Physical Activity: Inactive (10/6/2020)    Exercise Vital Sign     Days of Exercise per Week: 0 days     Minutes of Exercise per Session: 0 min   Stress: No Stress Concern Present (10/6/2020)    [employer] - Occupational Stress Questionnaire     Feeling of Stress : Only a little   Social Connections: Feeling Socially Integrated (2023)    OASIS : Social Isolation     Frequency of experiencing loneliness " or isolation: Rarely       Physical exam:  There were no vitals filed for this visit.  There is no height or weight on file to calculate BMI.  General: In no apparent distress; well developed and well nourished.  HEENT: normocephalic; atraumatic.  Cardiovascular: regular rate.  Respiratory: no increased work of breathing.  Musculoskeletal:   Gait: did not assess  Inspection:  Exam for the most part unchanged.  Severe bilateral lower extremity edema..  No residual ecchymosis..  Mild residual tenderness on deep palpation at both the medial and lateral malleolar fracture sites.  0/5 dorsiflexion strength bilaterally with attempted ankle dorsiflexion or toe extension.  3+/5 plantar flexion strength bilaterally.  Absent sensation on monofilament testing.  Weekly palpable pedal pulse.                     Imaging Studies/Outside documentation:  I have ordered/reviewed/interpreted the following images/outside documentation:  1. [facility] 3-views of Left ankle: persistent displaced left trimalleolar ankle fracture.  Again note significant comminution at the lateral malleolar fracture site.  Severe diffuse osteopenic changes throughout the foot and ankle.  While overall alignment is well-maintained.  There is still persistent fracture lines present, most notably at the medial malleolus.  No significant bony bridging present today.        Assessment:  Tripp Myers is a 82 y.o. male with Displaced Left trimalleolar ankle fracture; Chronic BLE neuropathy; Chronic bilateral foot drop     Plan:   Clinical and radiographic findings were discussed at length with the patient and his family who accompanies him today.  Operative versus nonoperative treatment options were described.  Patient is high risk for surgical intervention.  Given his extensive list of medical comorbidities and overall low functional status, recommend nonoperative management to include patient placed into a well-padded short-leg plaster splint.  He was to remain  strict nonweightbearing left lower extremity.  May resume home anticoagulant dosing as he has discontinued this on his own since time of fracture.    Discussed the potential for nonunion/malunion/symptomatic posttraumatic arthritis.  Patient and family understand that should this become an issue, we will address accordingly in the future.  Return to clinic in 2 weeks for repeat evaluation and x-ray.    07/11/2023:  Clinical and radiographic findings were again discussed today.  We will plan on continued conservative management.  Patient to be placed back into a well-padded short-leg plaster splint.  Continue strict nonweightbearing left lower extremity.  Return to clinic in 3-4 weeks for repeat evaluation and x-ray XOP.  Pending patient progress and interval fracture healing on x-ray, possible initiation of slow progressive weight-bearing in a tall boot at that time.  Patient and family voiced understanding.  All questions were answered.    08/03/2023:  Clinical and radiographic findings were discussed with the patient and his family again today.  While alignment is relatively well-maintained, there is no significant bony bridging present.  Mild early callus formation at the lateral malleolar site only.  Given concern for incomplete bone healing and potential joint destruction with early weight-bearing, we will place patient back into a well-padded short-leg plaster splint today.  He was to remain strict nonweightbearing left lower extremity.  Return to clinic in 3 weeks for repeat evaluation and x-ray.  Pending bone healing, possible initiation of weight-bearing in a tall boot at that time.      This note was created using voice recognition software and may contain grammatical errors.

## 2023-08-25 DIAGNOSIS — S82.852A DISPLACED TRIMALLEOLAR FRACTURE OF LEFT LOWER LEG, CLOSED, INITIAL ENCOUNTER: Primary | ICD-10-CM

## 2023-08-29 ENCOUNTER — HOSPITAL ENCOUNTER (OUTPATIENT)
Dept: RADIOLOGY | Facility: HOSPITAL | Age: 83
Discharge: HOME | End: 2023-08-29
Attending: ORTHOPAEDIC SURGERY
Payer: MEDICARE

## 2023-08-29 ENCOUNTER — OFFICE VISIT (OUTPATIENT)
Dept: ORTHOPEDICS | Facility: CLINIC | Age: 83
End: 2023-08-29
Payer: MEDICARE

## 2023-08-29 DIAGNOSIS — S82.852A DISPLACED TRIMALLEOLAR FRACTURE OF LEFT LOWER LEG, CLOSED, INITIAL ENCOUNTER: Primary | ICD-10-CM

## 2023-08-29 DIAGNOSIS — S82.852A DISPLACED TRIMALLEOLAR FRACTURE OF LEFT LOWER LEG, CLOSED, INITIAL ENCOUNTER: ICD-10-CM

## 2023-08-29 PROCEDURE — 73610 XR ANKLE COMPLETE 3 VIEW LEFT: ICD-10-PCS | Mod: 26,LT,, | Performed by: RADIOLOGY

## 2023-08-29 PROCEDURE — 99213 OFFICE O/P EST LOW 20 MIN: CPT | Mod: S$GLB,,, | Performed by: ORTHOPAEDIC SURGERY

## 2023-08-29 PROCEDURE — 1159F PR MEDICATION LIST DOCUMENTED IN MEDICAL RECORD: ICD-10-PCS | Mod: CPTII,S$GLB,, | Performed by: ORTHOPAEDIC SURGERY

## 2023-08-29 PROCEDURE — 1157F PR ADVANCE CARE PLAN OR EQUIV PRESENT IN MEDICAL RECORD: ICD-10-PCS | Mod: CPTII,S$GLB,, | Performed by: ORTHOPAEDIC SURGERY

## 2023-08-29 PROCEDURE — 1159F MED LIST DOCD IN RCRD: CPT | Mod: CPTII,S$GLB,, | Performed by: ORTHOPAEDIC SURGERY

## 2023-08-29 PROCEDURE — 99999 PR PBB SHADOW E&M-EST. PATIENT-LVL II: ICD-10-PCS | Mod: PBBFAC,,, | Performed by: ORTHOPAEDIC SURGERY

## 2023-08-29 PROCEDURE — 3288F FALL RISK ASSESSMENT DOCD: CPT | Mod: CPTII,S$GLB,, | Performed by: ORTHOPAEDIC SURGERY

## 2023-08-29 PROCEDURE — 3288F PR FALLS RISK ASSESSMENT DOCUMENTED: ICD-10-PCS | Mod: CPTII,S$GLB,, | Performed by: ORTHOPAEDIC SURGERY

## 2023-08-29 PROCEDURE — 73610 X-RAY EXAM OF ANKLE: CPT | Mod: 26,LT,, | Performed by: RADIOLOGY

## 2023-08-29 PROCEDURE — 1126F AMNT PAIN NOTED NONE PRSNT: CPT | Mod: CPTII,S$GLB,, | Performed by: ORTHOPAEDIC SURGERY

## 2023-08-29 PROCEDURE — 1157F ADVNC CARE PLAN IN RCRD: CPT | Mod: CPTII,S$GLB,, | Performed by: ORTHOPAEDIC SURGERY

## 2023-08-29 PROCEDURE — 1100F PR PT FALLS ASSESS DOC 2+ FALLS/FALL W/INJURY/YR: ICD-10-PCS | Mod: CPTII,S$GLB,, | Performed by: ORTHOPAEDIC SURGERY

## 2023-08-29 PROCEDURE — 1160F RVW MEDS BY RX/DR IN RCRD: CPT | Mod: CPTII,S$GLB,, | Performed by: ORTHOPAEDIC SURGERY

## 2023-08-29 PROCEDURE — 99999 PR PBB SHADOW E&M-EST. PATIENT-LVL II: CPT | Mod: PBBFAC,,, | Performed by: ORTHOPAEDIC SURGERY

## 2023-08-29 PROCEDURE — 1160F PR REVIEW ALL MEDS BY PRESCRIBER/CLIN PHARMACIST DOCUMENTED: ICD-10-PCS | Mod: CPTII,S$GLB,, | Performed by: ORTHOPAEDIC SURGERY

## 2023-08-29 PROCEDURE — 99213 PR OFFICE/OUTPT VISIT, EST, LEVL III, 20-29 MIN: ICD-10-PCS | Mod: S$GLB,,, | Performed by: ORTHOPAEDIC SURGERY

## 2023-08-29 PROCEDURE — 73610 X-RAY EXAM OF ANKLE: CPT | Mod: TC,PO,LT

## 2023-08-29 PROCEDURE — 1126F PR PAIN SEVERITY QUANTIFIED, NO PAIN PRESENT: ICD-10-PCS | Mod: CPTII,S$GLB,, | Performed by: ORTHOPAEDIC SURGERY

## 2023-08-29 PROCEDURE — 1100F PTFALLS ASSESS-DOCD GE2>/YR: CPT | Mod: CPTII,S$GLB,, | Performed by: ORTHOPAEDIC SURGERY

## 2023-08-29 NOTE — PROGRESS NOTES
Status/Diagnosis: Displaced Left trimalleolar ankle fracture; Chronic BLE neuropathy; Chronic bilateral foot drop  Date of Surgery: none  Date of Injury: 06/10/2023  Return visit: 1 month  X-rays on Return: [facility] 3-views Left ankle    Chief Complaint:   Left ankle pain    Present History:  Tripp Myers is a 83 y.o. male who presents today for new patient evaluation.  He is accompanied by his family.  He does have a complex past medical history.    Endorses sustaining a ground level fall while getting out of the car on 06/10/2023.  Immediate left ankle pain, swelling, inability to bear weight.    Denies any left ankle pain prior to the above.    At baseline he is a home ambulator using a cane or walker at all times.   On questioning, he does have AFOs for chronic foot drop.  He was wearing at the time of injury.    Bilateral lower extremity edema.  History significant for atrial fibrillation status post pacemaker replacement on Eliquis; CAD status post stent placement; CHF; emphysema; hypertension; severe bilateral lower extremity peripheral neuropathy and chronic bilateral footdrop that patient relates to longstanding lumbar stenosis.    07/11/2023:  Patient returns today with his family.  Planned on seeing him 2 weeks ago however was unable to make this duty logistical issues.  Presents today with no pain, 0/10.  Reports compliance with nonweightbearing status.  He is accompanied by multiple family members.  Ambulating in a wheelchair.  As supportive shoe with AFO in place to contralateral lower leg.    08/03/2023:  Returns today with his family for repeat clinical evaluation.  Overall doing well.  Endorses 0/10 pain.  Reports compliance with nonweightbearing status.  Continues to ambulate in a wheelchair.    08/29/2023:  Returns today for repeat clinical evaluation with the family.  Still doing well.  0/10 pain.  Reports compliance with nonweightbearing status.        Past Medical History:   Diagnosis Date     Asthma     Atrial fibrillation     Cancer     Bladder CA    Cardiac pacemaker in situ 6/1/2010    9/15 Biotronik    Cardiomyopathy, ischemic 1/19/2023 12/22 Moderate left atrial enlargement. Mildly decreased systolic function. The estimated ejection fraction is 40%. Grade III left ventricular diastolic dysfunction. Normal right ventricular size with normal right ventricular systolic function. There is mild aortic valve stenosis. Aortic valve area is 1.30 cm2; peak velocity is 1.31 m/s; mean gradient is 4 mmHg. Mild mitral regurgitation. Mild tri    Coronary artery disease     Coronary artery disease involving native coronary artery of native heart without angina pectoris 3/15/2022    3/22 Left main coronary artery-normal size vessel normal appearance. Left anterior descending-medium size vessel diffuse disease proximal mid up to proximally 45%.  First diagonal long branching vessel minimal disease less than 30%. Circumflex-normal size vessel long branching 1st obtuse marginal 100% occluded in its midportion with PETER 0 flow thereafter.  Second obtuse marginal distal circumflex    Diastolic CHF 5/16/2016    Hyperlipidemia 3/17/2011    Hypertension     Lumbar degenerative disc disease     MGUS (monoclonal gammopathy of unknown significance)     Neuropathy associated with MGUS     Nonrheumatic aortic valve stenosis 1/19/2023 12/212 Moderate left atrial enlargement. Mildly decreased systolic function. The estimated ejection fraction is 40%. Grade III left ventricular diastolic dysfunction. Normal right ventricular size with normal right ventricular systolic function. There is mild aortic valve stenosis. Aortic valve area is 1.30 cm2; peak velocity is 1.31 m/s; mean gradient is 4 mmHg. Mild mitral regurgitation. Mild tr    NSTEMI (non-ST elevated myocardial infarction)     Pacemaker     Paroxysmal atrial fibrillation 9/14/2015    S/P PTCA (percutaneous transluminal coronary angioplasty) - OM1 3/15/2022    3/22 IFR  of the LAD was 0.93 IFR of the 2nd obtuse marginal 0.94. Intervention: Successful angioplasty of the midportion the 1st obtuse marginal using 2.0 mm balloon with PETER 1 flow.  Found have a 2nd occlusive lesions in the distal obtuse marginal underwent several angioplasties using 1.5 mm balloon up to 5 minute inflation restoring PETER 3 flow with less than 15% residual stenosis.  No stent pl       Past Surgical History:   Procedure Laterality Date    ADENOIDECTOMY      ATRIAL ABLATION SURGERY      CARDIAC CATHETERIZATION      CATARACT EXTRACTION W/  INTRAOCULAR LENS IMPLANT Bilateral     COLON SURGERY      colonoscopy    CORONARY ANGIOGRAPHY N/A 02/14/2022    Procedure: ANGIOGRAM, CORONARY ARTERY;  Surgeon: Hugh Gómez III, MD;  Location: [facility] CATH;  Service: Cardiology;  Laterality: N/A;    EYE SURGERY      FRACTURE SURGERY      ORIF with hardware     INSERT / REPLACE / REMOVE PACEMAKER      LEFT HEART CATHETERIZATION Left 02/14/2022    Procedure: Left heart cath;  Surgeon: Hugh Gómez III, MD;  Location: [facility] CATH;  Service: Cardiology;  Laterality: Left;    LUMBAR EPIDURAL INJECTION      for spinal stenosis     PERCUTANEOUS TRANSLUMINAL BALLOON ANGIOPLASTY OF CORONARY ARTERY  03/05/2022    Procedure: Angioplasty-coronary;  Surgeon: Milton Jefferson MD;  Location: [facility]ATH;  Service: Cardiology;;    PROSTATE SURGERY      RETINAL DETACHMENT REPAIR W/ SCLERAL BUCKLE LE      TONSILLECTOMY         Current Outpatient Medications   Medication Sig    acetaminophen (TYLENOL) 500 MG tablet Take 500 mg by mouth every 6 (six) hours as needed for Pain.    allopurinoL (ZYLOPRIM) 100 MG tablet TAKE 1 TABLET(100 MG) BY MOUTH EVERY DAY    amiodarone (PACERONE) 200 MG Tab Take 1 tablet (200 mg total) by mouth once daily.    amLODIPine (NORVASC) 5 MG tablet Take 5 mg by mouth.    aspirin (ECOTRIN) 81 MG EC tablet Take 1 tablet (81 mg total) by mouth once daily.    azelastine (ASTELIN) 137 mcg (0.1 %) nasal spray USE 2 PUFFS IN  EACH NOSTRIL BID PRN    cholecalciferol, vitamin D3, 125 mcg (5,000 unit) Tab Take 5,000 Units by mouth every other day.    cyanocobalamin (VITAMIN B-12) 1000 MCG tablet Take 100 mcg by mouth every other day.    diclofenac sodium (VOLTAREN) 1 % Gel APPLY TO THE AFFECTED AREA TWICE DAILY    ELIQUIS 5 mg Tab TAKE 1 TABLET(5 MG) BY MOUTH TWICE DAILY    FLOVENT  mcg/actuation inhaler Inhale 2 puffs into the lungs Daily.    fluticasone propionate (FLONASE) 50 mcg/actuation nasal spray 1 spray (50 mcg total) by Each Nostril route once daily.    furosemide (LASIX) 20 MG tablet Take 1 tablet (20 mg total) by mouth 2 (two) times a day.    furosemide (LASIX) 40 MG tablet TAKE 1 TABLET(40 MG) BY MOUTH TWICE DAILY    GLUCOSAMINE HCL/CHONDR GODOY A NA (OSTEO BI-FLEX ORAL) Take 1 tablet by mouth once daily.    HYDROcodone-acetaminophen (NORCO) 5-325 mg per tablet Take 1 tablet by mouth every 6 (six) hours as needed for Pain.    ketoconazole (NIZORAL) 2 % cream Apply topically once daily.    latanoprost 0.005 % dpem latanoprost 0.005 % eye drops   INSTILL 1 DROP INTO AFFECTED EYE(S) BY OPHTHALMIC ROUTE ONCE DAILY INTHE EVENING    levothyroxine (SYNTHROID) 25 MCG tablet Take 1 tablet (25 mcg total) by mouth before breakfast.    magnesium 250 mg Tab Take 250 mg by mouth every other day.    metoprolol succinate (TOPROL-XL) 100 MG 24 hr tablet Take 1 tablet (100 mg total) by mouth every 12 (twelve) hours.    MYRBETRIQ 50 mg Tb24 TAKE 1 TABLET(50 MG) BY MOUTH EVERY DAY    omeprazole (PRILOSEC) 40 MG capsule Take 1 capsule (40 mg total) by mouth once daily.    tamsulosin (FLOMAX) 0.4 mg Cap Take 1 capsule (0.4 mg total) by mouth once daily.    TRELEGY ELLIPTA 200-62.5-25 mcg inhaler Inhale 1 puff into the lungs once daily.    valsartan (DIOVAN) 80 MG tablet Take 1 tablet (80 mg total) by mouth once daily.    vitamin E 400 UNIT capsule Take 400 Units by mouth every other day.    metOLazone (ZAROXOLYN) 2.5 MG tablet Take 1 tablet (2.5  "mg total) by mouth twice a week. for 21 days     No current facility-administered medications for this visit.       Review of patient's allergies indicates:   Allergen Reactions    Gabapentin Other (See Comments)     "Unknown"       Family History   Problem Relation Age of Onset    Cancer Mother     Diabetes Father     Alcohol abuse Father     Lumbar disc disease Sister        Social History     Socioeconomic History    Marital status:    Tobacco Use    Smoking status: Former     Current packs/day: 0.00     Average packs/day: 1 pack/day for 22.0 years (22.0 ttl pk-yrs)     Types: Cigarettes     Start date: 1954     Quit date: 1976     Years since quittin.6     Passive exposure: Never    Smokeless tobacco: Never    Tobacco comments:     quit at about 35 y/o   Substance and Sexual Activity    Alcohol use: Not Currently     Alcohol/week: 2.0 standard drinks of alcohol     Types: 2 Drinks containing 0.5 oz of alcohol per week     Comment: weekly    Drug use: No    Sexual activity: Not Currently     Partners: Female     Birth control/protection: None     Social Determinants of Health     Financial Resource Strain: Low Risk  (10/6/2020)    Overall Financial Resource Strain (CARDIA)     Difficulty of Paying Living Expenses: Not hard at all   Food Insecurity: No Food Insecurity (10/6/2020)    Hunger Vital Sign     Worried About Running Out of Food in the Last Year: Never true     Ran Out of Food in the Last Year: Never true   Transportation Needs: No Transportation Needs (2023)    OASIS : Transportation     Lack of Transportation (Medical): No     Lack of Transportation (Non-Medical): No     Patient Unable or Declines to Respond: No   Physical Activity: Inactive (10/6/2020)    Exercise Vital Sign     Days of Exercise per Week: 0 days     Minutes of Exercise per Session: 0 min   Stress: No Stress Concern Present (10/6/2020)    [employer] [place] [employer] - Occupational Stress " Questionnaire     Feeling of Stress : Only a little   Social Connections: Feeling Socially Integrated (4/28/2023)    OASIS : Social Isolation     Frequency of experiencing loneliness or isolation: Rarely       Physical exam:  There were no vitals filed for this visit.  There is no height or weight on file to calculate BMI.  General: In no apparent distress; well developed and well nourished.  HEENT: normocephalic; atraumatic.  Cardiovascular: regular rate.  Respiratory: no increased work of breathing.  Musculoskeletal:   Gait: did not assess  Inspection:  persistent severe bilateral lower extremity edema.  No residual ecchymosis..  Little to no residual tenderness on deep palpation at both the medial and lateral malleolar fracture sites.  0/5 dorsiflexion strength bilaterally with attempted ankle dorsiflexion or toe extension.  3+/5 plantar flexion strength bilaterally.  Absent sensation on monofilament testing.  Weekly palpable pedal pulse.                     Imaging Studies/Outside documentation:  I have ordered/reviewed/interpreted the following images/outside documentation:  1. [facility] 3-views of Left ankle: persistent displaced left trimalleolar ankle fracture.  Again note significant comminution at the lateral malleolar fracture site.  Severe diffuse osteopenic changes throughout the foot and ankle.   Overall alignment well-maintained.  There is a moderate amount of interval callus formation and bony bridging present compared to previous clinic films on 08/03/2023.  Minimal step-off at the medial malleolar fracture site.          Assessment:  Tripp Myers is a 83 y.o. male with Displaced Left trimalleolar ankle fracture; Chronic BLE neuropathy; Chronic bilateral foot drop     Plan:     08/29/2023:  Interval bony bridging and callus formation are present today.  We will begin slow progressive weight-bearing under the guidance of home health therapy that patient has already been seen.  Patient be fit  for a tall boot today.  This be worn at all times except for sleep, hygiene, home ankle range of motion exercises.  Discuss 50% weight-bearing weeks 1 and 2, 75% weight-bearing week 3, and 100% weight-bearing week 4.  Patient informed to let pain be his guide.  Return to clinic in 1 month for repeat evaluation and x-ray.  Patient and family voiced understanding.  All questions were answered.      We performed a custom orthotic/brace fitting, adjusting and training with the patient. The patient demonstrated understanding and proper care. This was performed for 15 minutes.        This note was created using voice recognition software and may contain grammatical errors.

## 2023-09-06 PROBLEM — K62.5 RECTAL BLEEDING: Status: ACTIVE | Noted: 2023-09-06

## 2023-09-08 PROBLEM — R60.0 EDEMA OF LEFT LOWER EXTREMITY: Status: ACTIVE | Noted: 2023-09-08

## 2023-09-08 PROBLEM — Z87.81 HISTORY OF TIBIAL FRACTURE: Status: ACTIVE | Noted: 2023-09-08

## 2023-09-13 PROBLEM — R26.9 GAIT ABNORMALITY: Chronic | Status: ACTIVE | Noted: 2023-04-11

## 2023-09-13 PROBLEM — S82.852A: Status: ACTIVE | Noted: 2023-09-13

## 2023-09-13 PROBLEM — Z71.89 ACP (ADVANCE CARE PLANNING): Chronic | Status: ACTIVE | Noted: 2022-03-05

## 2023-09-13 PROBLEM — K21.00 HIATAL HERNIA WITH GERD AND ESOPHAGITIS: Status: ACTIVE | Noted: 2023-09-13

## 2023-09-13 PROBLEM — E66.811 CLASS 1 OBESITY DUE TO EXCESS CALORIES WITH SERIOUS COMORBIDITY AND BODY MASS INDEX (BMI) OF 33.0 TO 33.9 IN ADULT: Status: RESOLVED | Noted: 2019-04-25 | Resolved: 2023-09-13

## 2023-09-13 PROBLEM — I48.20 CHRONIC ATRIAL FIBRILLATION: Status: ACTIVE | Noted: 2023-09-13

## 2023-09-13 PROBLEM — Z79.01 LONG TERM (CURRENT) USE OF ANTICOAGULANTS: Chronic | Status: ACTIVE | Noted: 2021-11-17

## 2023-09-13 PROBLEM — E66.09 CLASS 1 OBESITY DUE TO EXCESS CALORIES WITH SERIOUS COMORBIDITY AND BODY MASS INDEX (BMI) OF 33.0 TO 33.9 IN ADULT: Status: RESOLVED | Noted: 2019-04-25 | Resolved: 2023-09-13

## 2023-09-13 PROBLEM — Z91.81 HISTORY OF FALLING: Status: ACTIVE | Noted: 2022-02-17

## 2023-09-13 PROBLEM — I25.5 CARDIOMYOPATHY, ISCHEMIC: Chronic | Status: ACTIVE | Noted: 2023-01-19

## 2023-09-13 PROBLEM — Z87.19 HISTORY OF GI BLEED: Status: ACTIVE | Noted: 2023-09-13

## 2023-09-13 PROBLEM — Z79.01 LONG TERM (CURRENT) USE OF ANTICOAGULANTS: Status: ACTIVE | Noted: 2021-11-17

## 2023-09-13 PROBLEM — J90 PLEURAL EFFUSION: Status: RESOLVED | Noted: 2022-11-16 | Resolved: 2023-09-13

## 2023-09-13 PROBLEM — Z98.890 STATUS POST BALLOON DILATATION OF ESOPHAGEAL STRICTURE: Status: RESOLVED | Noted: 2019-04-25 | Resolved: 2023-09-13

## 2023-09-13 PROBLEM — K44.9 HIATAL HERNIA WITH GERD AND ESOPHAGITIS: Status: ACTIVE | Noted: 2023-09-13

## 2023-09-13 PROBLEM — I82.401 DEEP VEIN THROMBOSIS (DVT) OF RIGHT LOWER EXTREMITY: Status: RESOLVED | Noted: 2022-02-11 | Resolved: 2023-09-13

## 2023-09-13 PROBLEM — C67.9 MALIGNANT NEOPLASM OF URINARY BLADDER: Status: RESOLVED | Noted: 2020-05-26 | Resolved: 2023-09-13

## 2023-09-13 PROBLEM — S82.852D: Status: ACTIVE | Noted: 2023-09-13

## 2023-09-13 PROBLEM — M84.474A METATARSAL FRACTURE, PATHOLOGIC, RIGHT, INITIAL ENCOUNTER: Status: RESOLVED | Noted: 2022-02-11 | Resolved: 2023-09-13

## 2023-09-13 PROBLEM — J44.1 ACUTE EXACERBATION OF COPD WITH ASTHMA: Status: RESOLVED | Noted: 2021-03-11 | Resolved: 2023-09-13

## 2023-09-13 PROBLEM — J45.901 ACUTE EXACERBATION OF COPD WITH ASTHMA: Status: RESOLVED | Noted: 2021-03-11 | Resolved: 2023-09-13

## 2023-09-13 PROBLEM — I10 ESSENTIAL HYPERTENSION: Chronic | Status: ACTIVE | Noted: 2019-04-25

## 2023-09-28 DIAGNOSIS — S82.852A DISPLACED TRIMALLEOLAR FRACTURE OF LEFT LOWER LEG, CLOSED, INITIAL ENCOUNTER: Primary | ICD-10-CM

## 2023-09-29 ENCOUNTER — OFFICE VISIT (OUTPATIENT)
Dept: ORTHOPEDICS | Facility: CLINIC | Age: 83
End: 2023-09-29
Payer: MEDICARE

## 2023-09-29 ENCOUNTER — HOSPITAL ENCOUNTER (OUTPATIENT)
Dept: RADIOLOGY | Facility: HOSPITAL | Age: 83
Discharge: HOME | End: 2023-09-29
Attending: ORTHOPAEDIC SURGERY
Payer: MEDICARE

## 2023-09-29 DIAGNOSIS — S82.852A DISPLACED TRIMALLEOLAR FRACTURE OF LEFT LOWER LEG, CLOSED, INITIAL ENCOUNTER: Primary | ICD-10-CM

## 2023-09-29 DIAGNOSIS — S82.852A DISPLACED TRIMALLEOLAR FRACTURE OF LEFT LOWER LEG, CLOSED, INITIAL ENCOUNTER: ICD-10-CM

## 2023-09-29 PROCEDURE — 1159F PR MEDICATION LIST DOCUMENTED IN MEDICAL RECORD: ICD-10-PCS | Mod: CPTII,S$GLB,, | Performed by: ORTHOPAEDIC SURGERY

## 2023-09-29 PROCEDURE — 99999 PR PBB SHADOW E&M-EST. PATIENT-LVL III: ICD-10-PCS | Mod: PBBFAC,,, | Performed by: ORTHOPAEDIC SURGERY

## 2023-09-29 PROCEDURE — 3288F FALL RISK ASSESSMENT DOCD: CPT | Mod: CPTII,S$GLB,, | Performed by: ORTHOPAEDIC SURGERY

## 2023-09-29 PROCEDURE — 99999 PR PBB SHADOW E&M-EST. PATIENT-LVL III: CPT | Mod: PBBFAC,,, | Performed by: ORTHOPAEDIC SURGERY

## 2023-09-29 PROCEDURE — 1100F PR PT FALLS ASSESS DOC 2+ FALLS/FALL W/INJURY/YR: ICD-10-PCS | Mod: CPTII,S$GLB,, | Performed by: ORTHOPAEDIC SURGERY

## 2023-09-29 PROCEDURE — 99213 OFFICE O/P EST LOW 20 MIN: CPT | Mod: S$GLB,,, | Performed by: ORTHOPAEDIC SURGERY

## 2023-09-29 PROCEDURE — 1126F PR PAIN SEVERITY QUANTIFIED, NO PAIN PRESENT: ICD-10-PCS | Mod: CPTII,S$GLB,, | Performed by: ORTHOPAEDIC SURGERY

## 2023-09-29 PROCEDURE — 1111F PR DISCHARGE MEDS RECONCILED W/ CURRENT OUTPATIENT MED LIST: ICD-10-PCS | Mod: CPTII,S$GLB,, | Performed by: ORTHOPAEDIC SURGERY

## 2023-09-29 PROCEDURE — 1100F PTFALLS ASSESS-DOCD GE2>/YR: CPT | Mod: CPTII,S$GLB,, | Performed by: ORTHOPAEDIC SURGERY

## 2023-09-29 PROCEDURE — 73610 XR ANKLE COMPLETE 3 VIEW LEFT: ICD-10-PCS | Mod: 26,LT,, | Performed by: RADIOLOGY

## 2023-09-29 PROCEDURE — 1159F MED LIST DOCD IN RCRD: CPT | Mod: CPTII,S$GLB,, | Performed by: ORTHOPAEDIC SURGERY

## 2023-09-29 PROCEDURE — 73610 X-RAY EXAM OF ANKLE: CPT | Mod: 26,LT,, | Performed by: RADIOLOGY

## 2023-09-29 PROCEDURE — 1126F AMNT PAIN NOTED NONE PRSNT: CPT | Mod: CPTII,S$GLB,, | Performed by: ORTHOPAEDIC SURGERY

## 2023-09-29 PROCEDURE — 1157F ADVNC CARE PLAN IN RCRD: CPT | Mod: CPTII,S$GLB,, | Performed by: ORTHOPAEDIC SURGERY

## 2023-09-29 PROCEDURE — 1157F PR ADVANCE CARE PLAN OR EQUIV PRESENT IN MEDICAL RECORD: ICD-10-PCS | Mod: CPTII,S$GLB,, | Performed by: ORTHOPAEDIC SURGERY

## 2023-09-29 PROCEDURE — 73610 X-RAY EXAM OF ANKLE: CPT | Mod: TC,PO,LT

## 2023-09-29 PROCEDURE — 1160F RVW MEDS BY RX/DR IN RCRD: CPT | Mod: CPTII,S$GLB,, | Performed by: ORTHOPAEDIC SURGERY

## 2023-09-29 PROCEDURE — 1111F DSCHRG MED/CURRENT MED MERGE: CPT | Mod: CPTII,S$GLB,, | Performed by: ORTHOPAEDIC SURGERY

## 2023-09-29 PROCEDURE — 99213 PR OFFICE/OUTPT VISIT, EST, LEVL III, 20-29 MIN: ICD-10-PCS | Mod: S$GLB,,, | Performed by: ORTHOPAEDIC SURGERY

## 2023-09-29 PROCEDURE — 3288F PR FALLS RISK ASSESSMENT DOCUMENTED: ICD-10-PCS | Mod: CPTII,S$GLB,, | Performed by: ORTHOPAEDIC SURGERY

## 2023-09-29 PROCEDURE — 1160F PR REVIEW ALL MEDS BY PRESCRIBER/CLIN PHARMACIST DOCUMENTED: ICD-10-PCS | Mod: CPTII,S$GLB,, | Performed by: ORTHOPAEDIC SURGERY

## 2023-09-29 NOTE — PROGRESS NOTES
Status/Diagnosis: Displaced Left trimalleolar ankle fracture; Chronic BLE neuropathy; Chronic bilateral foot drop  Date of Surgery: none  Date of Injury: 06/10/2023  Return visit: PRN  X-rays on Return: pending patient complaint    Chief Complaint:   Left ankle pain    Present History:  Tripp Myers is a 83 y.o. male who presents today for new patient evaluation.  He is accompanied by his family.  He does have a complex past medical history.    Endorses sustaining a ground level fall while getting out of the car on 06/10/2023.  Immediate left ankle pain, swelling, inability to bear weight.    Denies any left ankle pain prior to the above.    At baseline he is a home ambulator using a cane or walker at all times.   On questioning, he does have AFOs for chronic foot drop.  He was wearing at the time of injury.    Bilateral lower extremity edema.  History significant for atrial fibrillation status post pacemaker replacement on Eliquis; CAD status post stent placement; CHF; emphysema; hypertension; severe bilateral lower extremity peripheral neuropathy and chronic bilateral footdrop that patient relates to longstanding lumbar stenosis.    07/11/2023:  Patient returns today with his family.  Planned on seeing him 2 weeks ago however was unable to make this duty logistical issues.  Presents today with no pain, 0/10.  Reports compliance with nonweightbearing status.  He is accompanied by multiple family members.  Ambulating in a wheelchair.  As supportive shoe with AFO in place to contralateral lower leg.    08/03/2023:  Returns today with his family for repeat clinical evaluation.  Overall doing well.  Endorses 0/10 pain.  Reports compliance with nonweightbearing status.  Continues to ambulate in a wheelchair.    08/29/2023:  Returns today for repeat clinical evaluation with the family.  Still doing well.  0/10 pain.  Reports compliance with nonweightbearing status.      09/29/2023:  Patient returns today with  family for repeat clinical evaluation.  Currently in a wheelchair but states he has been ambulating in the tall boot without issue.  0/10 pain.  Inquiring about a new brace as he has previously been fit for AFO.  Specifically asked about a brace with more ankle support .      Past Medical History:   Diagnosis Date    Asthma     Atrial fibrillation     Cancer     Bladder CA    Cardiac pacemaker in situ 6/1/2010    9/15 Biotronik    Cardiomyopathy, ischemic 1/19/2023 12/22 Moderate left atrial enlargement. Mildly decreased systolic function. The estimated ejection fraction is 40%. Grade III left ventricular diastolic dysfunction. Normal right ventricular size with normal right ventricular systolic function. There is mild aortic valve stenosis. Aortic valve area is 1.30 cm2; peak velocity is 1.31 m/s; mean gradient is 4 mmHg. Mild mitral regurgitation. Mild tri    Closed disp trimalleolar fx of left lower leg with routine healing 9/13/2023    Dulce Maria Haque    Coronary artery disease     Coronary artery disease involving native coronary artery of native heart without angina pectoris 3/15/2022    3/22 Left main coronary artery-normal size vessel normal appearance. Left anterior descending-medium size vessel diffuse disease proximal mid up to proximally 45%.  First diagonal long branching vessel minimal disease less than 30%. Circumflex-normal size vessel long branching 1st obtuse marginal 100% occluded in its midportion with PETER 0 flow thereafter.  Second obtuse marginal distal circumflex    Diastolic CHF 5/16/2016    Displaced trimalleolar fracture of left lower leg 9/13/2023    Hiatal hernia with GERD and esophagitis 9/13/2023    EGD 9/7/23, D [facility]    Hyperlipidemia 3/17/2011    Hypertension     Lumbar degenerative disc disease     MGUS (monoclonal gammopathy of unknown significance)     Neuropathy associated with MGUS     Nonrheumatic aortic valve stenosis 1/19/2023 12/212 Moderate left atrial  enlargement. Mildly decreased systolic function. The estimated ejection fraction is 40%. Grade III left ventricular diastolic dysfunction. Normal right ventricular size with normal right ventricular systolic function. There is mild aortic valve stenosis. Aortic valve area is 1.30 cm2; peak velocity is 1.31 m/s; mean gradient is 4 mmHg. Mild mitral regurgitation. Mild tr    NSTEMI (non-ST elevated myocardial infarction)     Pacemaker     Paroxysmal atrial fibrillation 9/14/2015    S/P PTCA (percutaneous transluminal coronary angioplasty) - OM1 3/15/2022    3/22 IFR of the LAD was 0.93 IFR of the 2nd obtuse marginal 0.94. Intervention: Successful angioplasty of the midportion the 1st obtuse marginal using 2.0 mm balloon with PETER 1 flow.  Found have a 2nd occlusive lesions in the distal obtuse marginal underwent several angioplasties using 1.5 mm balloon up to 5 minute inflation restoring PETER 3 flow with less than 15% residual stenosis.  No stent pl       Past Surgical History:   Procedure Laterality Date    ADENOIDECTOMY      ATRIAL ABLATION SURGERY      CARDIAC CATHETERIZATION      CATARACT EXTRACTION W/  INTRAOCULAR LENS IMPLANT Bilateral     COLON SURGERY      colonoscopy    COLONOSCOPY N/A 9/7/2023    Procedure: COLONOSCOPY;  Surgeon: Harinder Segura MD;  Location: [facility] ENDO;  Service: Endoscopy;  Laterality: N/A;    CORONARY ANGIOGRAPHY N/A 02/14/2022    Procedure: ANGIOGRAM, CORONARY ARTERY;  Surgeon: Hugh Gómez III, MD;  Location: [facility] CATH;  Service: Cardiology;  Laterality: N/A;    ESOPHAGOGASTRODUODENOSCOPY N/A 9/7/2023    Procedure: EGD (ESOPHAGOGASTRODUODENOSCOPY);  Surgeon: Harinder Segura MD;  Location: [facility] ENDO;  Service: Endoscopy;  Laterality: N/A;    EYE SURGERY      FRACTURE SURGERY      ORIF with hardware     INSERT / REPLACE / REMOVE PACEMAKER      LEFT HEART CATHETERIZATION Left 02/14/2022    Procedure: Left heart cath;  Surgeon: Hugh Gómez III, MD;  Location: [facility] CATH;  Service:  Cardiology;  Laterality: Left;    LUMBAR EPIDURAL INJECTION      for spinal stenosis     PERCUTANEOUS TRANSLUMINAL BALLOON ANGIOPLASTY OF CORONARY ARTERY  03/05/2022    Procedure: Angioplasty-coronary;  Surgeon: Milton Jefferson MD;  [facility];  Service: Cardiology;;    PROSTATE SURGERY      RETINAL DETACHMENT REPAIR W/ SCLERAL BUCKLE LE      TONSILLECTOMY         Current Outpatient Medications   Medication Sig    albuterol (PROVENTIL) 2.5 mg /3 mL (0.083 %) nebulizer solution Take 3 mLs (2.5 mg total) by nebulization 4 (four) times daily as needed for Wheezing or Shortness of Breath. Rescue    allopurinoL (ZYLOPRIM) 100 MG tablet TAKE 1 TABLET(100 MG) BY MOUTH EVERY DAY (Patient taking differently: Take 100 mg by mouth once daily.)    amiodarone (PACERONE) 200 MG Tab Take 1 tablet (200 mg total) by mouth once daily.    apixaban (ELIQUIS) 5 mg Tab Take 1 tablet (5 mg total) by mouth 2 (two) times daily.    aspirin (ECOTRIN) 81 MG EC tablet Take 1 tablet (81 mg total) by mouth once daily.    azelastine (ASTELIN) 137 mcg (0.1 %) nasal spray USE 2 PUFFS IN EACH NOSTRIL BID PRN    cholecalciferol, vitamin D3, 125 mcg (5,000 unit) Tab Take 5,000 Units by mouth every other day.    cyanocobalamin (VITAMIN B-12) 1000 MCG tablet Take 100 mcg by mouth once daily.    FLOVENT  mcg/actuation inhaler Inhale 2 puffs into the lungs Daily.    fluticasone propionate (FLONASE) 50 mcg/actuation nasal spray 1 spray (50 mcg total) by Each Nostril route once daily.    furosemide (LASIX) 20 MG tablet Take 1 tablet (20 mg total) by mouth once daily. Take an extra 20mg tablet if your weight increases above 2 pounds in 24-48 hours.    GLUCOSAMINE HCL/CHONDR GODOY A NA (OSTEO BI-FLEX ORAL) Take 1 tablet by mouth once daily.    ipratropium (ATROVENT) 0.02 % nebulizer solution Take 2.5 mLs (500 mcg total) by nebulization every 6 (six) hours as needed for Wheezing. Rescue    ketoconazole (NIZORAL) 2 % cream Apply topically once daily.  "   latanoprost 0.005 % ophthalmic solution Place 1 drop into both eyes.    levothyroxine (SYNTHROID) 25 MCG tablet Take 1 tablet (25 mcg total) by mouth before breakfast.    magnesium 250 mg Tab Take 250 mg by mouth every other day.    metoprolol succinate (TOPROL-XL) 25 MG 24 hr tablet Take 0.5 tablets (12.5 mg total) by mouth 2 (two) times daily.    MYRBETRIQ 50 mg Tb24 TAKE 1 TABLET(50 MG) BY MOUTH EVERY DAY (Patient taking differently: Take 50 mg by mouth every evening.)    omeprazole (PRILOSEC) 40 MG capsule Take 1 capsule (40 mg total) by mouth once daily.    POTASSIUM GLUCONATE ORAL Take 90 mg by mouth once daily.    tamsulosin (FLOMAX) 0.4 mg Cap Take 1 capsule (0.4 mg total) by mouth once daily.    TRELEGY ELLIPTA 200-62.5-25 mcg inhaler Inhale 1 puff into the lungs once daily.    vitamin E 400 UNIT capsule Take 400 Units by mouth every other day.     No current facility-administered medications for this visit.       Review of patient's allergies indicates:   Allergen Reactions    Gabapentin Other (See Comments)     "Unknown"       Family History   Problem Relation Age of Onset    Cancer Mother     Diabetes Father     Alcohol abuse Father     Lumbar disc disease Sister        Social History     Socioeconomic History    Marital status:    Tobacco Use    Smoking status: Former     Current packs/day: 0.00     Average packs/day: 1 pack/day for 22.0 years (22.0 ttl pk-yrs)     Types: Cigarettes     Start date: 1954     Quit date: 1976     Years since quittin.7     Passive exposure: Never    Smokeless tobacco: Never    Tobacco comments:     quit at about 35 y/o   Substance and Sexual Activity    Alcohol use: Not Currently     Alcohol/week: 2.0 standard drinks of alcohol     Types: 2 Drinks containing 0.5 oz of alcohol per week     Comment: weekly    Drug use: No    Sexual activity: Not Currently     Partners: Female     Birth control/protection: None     Social Determinants of Health "     Financial Resource Strain: Low Risk  (9/10/2023)    Overall Financial Resource Strain (CARDIA)     Difficulty of Paying Living Expenses: Not hard at all   Food Insecurity: No Food Insecurity (9/10/2023)    Hunger Vital Sign     Worried About Running Out of Food in the Last Year: Never true     Ran Out of Food in the Last Year: Never true   Transportation Needs: Unmet Transportation Needs (9/10/2023)    PRAPARE - Transportation     Lack of Transportation (Medical): Yes     Lack of Transportation (Non-Medical): Yes   Physical Activity: Inactive (9/10/2023)    Exercise Vital Sign     Days of Exercise per Week: 0 days     Minutes of Exercise per Session: 30 min   Stress: No Stress Concern Present (9/10/2023)    [employer] - Occupational Stress Questionnaire     Feeling of Stress : Not at all   Social Connections: Moderately Integrated (9/10/2023)    Social Connection and Isolation Panel [NHANES]     Frequency of Communication with Friends and Family: More than three times a week     Frequency of Social Gatherings with Friends and Family: Once a week     Attends Anabaptism Services: More than 4 times per year     Active Member of Clubs or Organizations: No     Attends Club or Organization Meetings: Never     Marital Status:    Housing Stability: Low Risk  (9/10/2023)    Housing Stability Vital Sign     Unable to Pay for Housing in the Last Year: No     Number of Places Lived in the Last Year: 1     Unstable Housing in the Last Year: No       Physical exam:  There were no vitals filed for this visit.  There is no height or weight on file to calculate BMI.  General: In no apparent distress; well developed and well nourished.  HEENT: normocephalic; atraumatic.  Cardiovascular: regular rate.  Respiratory: no increased work of breathing.  Musculoskeletal:   Gait: did not assess  Inspection:  persistent severe bilateral lower extremity edema, slightly increased on the left today.   Negative Homans sign.  No residual ecchymosis. No residual tenderness on deep palpation at both the medial and lateral malleolar fracture sites.  0/5 dorsiflexion strength bilaterally with attempted ankle dorsiflexion or toe extension.  3+/5 plantar flexion strength bilaterally.  Absent sensation on monofilament testing.  Weekly palpable pedal pulse.                     Imaging Studies/Outside documentation:  I have ordered/reviewed/interpreted the following images/outside documentation:  1. NWB 3-views of Left ankle: persistent displaced left trimalleolar ankle fracture.  Again note significant comminution at the lateral malleolar fracture site.  Severe diffuse osteopenic changes throughout the foot and ankle.   Overall alignment well-maintained.  Patient with continued callus formation and bony bridging with near-complete fracture healing.        Assessment:  Tripp Myers is a 83 y.o. male with Displaced Left trimalleolar ankle fracture; Chronic BLE neuropathy; Chronic bilateral foot drop     Plan:   Clinical and radiographic findings were discussed with the patient and family today.  Once patient is full weight-bearing in the boot for at least 7-14 days, he may then may begin to transition out of the boot back into normal shoe wear.  As noted in HPI, patient uses AFOs at baseline for chronic bilateral foot drop.  We will renew physical therapy with emphasis on gait training to avoid future falls.  Patient and family voiced understanding.  All questions were answered.  He will follow up on an as-needed basis.      This note was created using voice recognition software and may contain grammatical errors.

## 2023-12-08 DIAGNOSIS — N40.0 BENIGN PROSTATIC HYPERPLASIA, UNSPECIFIED WHETHER LOWER URINARY TRACT SYMPTOMS PRESENT: ICD-10-CM

## 2023-12-11 PROBLEM — K62.5 RECTAL BLEEDING: Status: RESOLVED | Noted: 2023-09-06 | Resolved: 2023-12-11

## 2023-12-11 RX ORDER — MIRABEGRON 50 MG/1
TABLET, FILM COATED, EXTENDED RELEASE ORAL
Qty: 30 TABLET | Refills: 11 | Status: SHIPPED | OUTPATIENT
Start: 2023-12-11

## 2023-12-20 DIAGNOSIS — N13.8 ENLARGED PROSTATE WITH URINARY OBSTRUCTION: Primary | ICD-10-CM

## 2023-12-20 DIAGNOSIS — N40.1 ENLARGED PROSTATE WITH URINARY OBSTRUCTION: Primary | ICD-10-CM

## 2023-12-20 RX ORDER — TAMSULOSIN HYDROCHLORIDE 0.4 MG/1
0.4 CAPSULE ORAL DAILY
Qty: 30 CAPSULE | Refills: 11 | Status: CANCELLED | OUTPATIENT
Start: 2023-12-20 | End: 2024-12-19

## 2023-12-20 RX ORDER — TAMSULOSIN HYDROCHLORIDE 0.4 MG/1
0.4 CAPSULE ORAL DAILY
Qty: 30 CAPSULE | Refills: 11 | Status: SHIPPED | OUTPATIENT
Start: 2023-12-20 | End: 2024-12-19

## 2023-12-20 NOTE — TELEPHONE ENCOUNTER
----- Message from Kassandra Ortiz sent at 12/20/2023 11:36 AM CST -----  Type:  RX Refill Request    Who Called:  [facility]  [place]     Refill or New Rx: Refill     RX Name and Strength:tamsulosin (FLOMAX) 0.4 mg Cap    How is the patient currently taking it? Sig - Route: Take 1 capsule (0.4 mg total) by mouth once daily. - Oral  Sent to pharmacy as: tamsulosin (FLOMAX) 0.4 mg Cap  Class: Normal        Is this a 30 day or 90 day RX:90 day    Preferred Pharmacy with phone number:[facility]  [address] [place] [address]    Local or Mail Order: Local     Ordering Provider:    Would the patient rather a call back or a response via [facility]?  Call back     Best Call Back Number: [phone] (mobile)     Additional Information:

## 2024-02-28 ENCOUNTER — TELEPHONE (OUTPATIENT)
Dept: UROLOGY | Facility: CLINIC | Age: 84
End: 2024-02-28
Payer: MEDICARE

## 2024-02-28 NOTE — TELEPHONE ENCOUNTER
----- Message from Violettarunnichol Rebolledo sent at 2/28/2024 10:52 AM CST -----  Regarding: Appt Request  Appointment Request      Caller is requesting an appointment.      Name of Caller: Pt     Symptoms: Follow up     Would the patient rather a call back or a response via [facility]?  Call back    Best Call Back Number: [phone]      Additional Information: Sts would like a call from the office to get him seen.  Please Advise - Thank you

## 2024-03-15 ENCOUNTER — PATIENT MESSAGE (OUTPATIENT)
Dept: UROLOGY | Facility: CLINIC | Age: 84
End: 2024-03-15
Payer: MEDICARE

## 2024-04-18 PROBLEM — R13.12 OROPHARYNGEAL DYSPHAGIA: Status: RESOLVED | Noted: 2024-04-18 | Resolved: 2024-04-18

## 2024-04-18 PROBLEM — R13.13 DYSPHAGIA, PHARYNGEAL: Status: ACTIVE | Noted: 2024-04-18

## 2024-04-18 PROBLEM — R13.12 OROPHARYNGEAL DYSPHAGIA: Status: ACTIVE | Noted: 2024-04-18

## 2024-05-06 ENCOUNTER — OFFICE VISIT (OUTPATIENT)
Dept: UROLOGY | Facility: CLINIC | Age: 84
End: 2024-05-06
Payer: MEDICARE

## 2024-05-06 VITALS — WEIGHT: 175 LBS | HEIGHT: 71 IN | BODY MASS INDEX: 24.5 KG/M2

## 2024-05-06 DIAGNOSIS — R33.9 URINARY RETENTION: ICD-10-CM

## 2024-05-06 DIAGNOSIS — N40.1 ENLARGED PROSTATE WITH URINARY OBSTRUCTION: Primary | ICD-10-CM

## 2024-05-06 DIAGNOSIS — N13.8 ENLARGED PROSTATE WITH URINARY OBSTRUCTION: Primary | ICD-10-CM

## 2024-05-06 PROCEDURE — 1126F AMNT PAIN NOTED NONE PRSNT: CPT | Mod: CPTII,S$GLB,, | Performed by: UROLOGY

## 2024-05-06 PROCEDURE — 99999 PR PBB SHADOW E&M-EST. PATIENT-LVL III: CPT | Mod: PBBFAC,,, | Performed by: UROLOGY

## 2024-05-06 PROCEDURE — 1101F PT FALLS ASSESS-DOCD LE1/YR: CPT | Mod: CPTII,S$GLB,, | Performed by: UROLOGY

## 2024-05-06 PROCEDURE — 3288F FALL RISK ASSESSMENT DOCD: CPT | Mod: CPTII,S$GLB,, | Performed by: UROLOGY

## 2024-05-06 PROCEDURE — 1157F ADVNC CARE PLAN IN RCRD: CPT | Mod: CPTII,S$GLB,, | Performed by: UROLOGY

## 2024-05-06 PROCEDURE — 99213 OFFICE O/P EST LOW 20 MIN: CPT | Mod: S$GLB,,, | Performed by: UROLOGY

## 2024-05-06 PROCEDURE — 1159F MED LIST DOCD IN RCRD: CPT | Mod: CPTII,S$GLB,, | Performed by: UROLOGY

## 2024-05-06 NOTE — PROGRESS NOTES
Subjective:       Patient ID: Tripp Myers is a 83 y.o. male.    Chief Complaint: Annual Exam    HPI    83-year-old with a history of BPH. He underwent Urolift in 2018 and then repeated in 2020.  He subsequently had an episode of urinary retention is currently taking Flomax.  Overall he feels that his symptoms are under good control.  He denies hematuria and dysuria.  His urinalysis is clear today.    Urine dipstick shows negative for all components.    Review of Systems   Constitutional:  Negative for fever.   Genitourinary:  Negative for dysuria and hematuria.       Objective:      Physical Exam  Vitals reviewed.   Constitutional:       Appearance: He is well-developed.   Pulmonary:      Effort: Pulmonary effort is normal.   Skin:     Findings: No rash.   Neurological:      Mental Status: He is alert and oriented to person, place, and time.         Assessment:       1. Enlarged prostate with urinary obstruction    2. Urinary retention        Plan:       Enlarged prostate with urinary obstruction    Urinary retention      Continue Flomax.  Annual follow-up.

## 2024-09-19 PROBLEM — I25.5 CARDIOMYOPATHY, ISCHEMIC: Chronic | Status: RESOLVED | Noted: 2023-01-19 | Resolved: 2024-09-19

## 2024-09-19 PROBLEM — E03.9 ACQUIRED HYPOTHYROIDISM: Status: ACTIVE | Noted: 2024-09-19

## 2024-09-20 PROBLEM — Z73.6 LIMITATION OF ACTIVITY DUE TO DISABILITY: Status: ACTIVE | Noted: 2024-09-20

## 2024-09-20 PROBLEM — J96.01 ACUTE HYPOXEMIC RESPIRATORY FAILURE: Status: ACTIVE | Noted: 2024-09-20

## 2024-09-20 PROBLEM — E46 MALNUTRITION: Status: ACTIVE | Noted: 2024-09-20

## 2024-09-22 PROBLEM — I42.9 CARDIOMYOPATHY: Status: ACTIVE | Noted: 2024-09-22

## 2024-09-26 PROBLEM — R04.0 EPISTAXIS: Status: ACTIVE | Noted: 2024-09-26

## 2024-09-28 PROBLEM — D72.829 LEUKOCYTOSIS: Status: ACTIVE | Noted: 2024-09-28

## 2024-09-29 PROBLEM — J96.01 ACUTE HYPOXEMIC RESPIRATORY FAILURE: Status: RESOLVED | Noted: 2024-09-20 | Resolved: 2024-09-29

## 2024-10-28 RX ORDER — TAMSULOSIN HYDROCHLORIDE 0.4 MG/1
1 CAPSULE ORAL DAILY
COMMUNITY
Start: 2024-10-28

## 2024-10-28 RX ORDER — PANTOPRAZOLE SODIUM 40 MG/1
40 TABLET, DELAYED RELEASE ORAL
COMMUNITY
Start: 2024-10-25

## 2024-12-23 PROBLEM — C88.01: Status: ACTIVE | Noted: 2017-02-16

## 2024-12-23 PROBLEM — J45.30 MILD PERSISTENT ASTHMA WITHOUT COMPLICATION: Status: ACTIVE | Noted: 2024-12-23

## 2025-03-11 PROBLEM — Z93.1 S/P PERCUTANEOUS ENDOSCOPIC GASTROSTOMY (PEG) TUBE PLACEMENT: Status: ACTIVE | Noted: 2025-03-11

## 2025-05-07 ENCOUNTER — OFFICE VISIT (OUTPATIENT)
Dept: UROLOGY | Facility: CLINIC | Age: 85
End: 2025-05-07
Payer: MEDICARE

## 2025-05-07 VITALS — HEIGHT: 70 IN | WEIGHT: 177.5 LBS | BODY MASS INDEX: 25.41 KG/M2

## 2025-05-07 DIAGNOSIS — N40.1 ENLARGED PROSTATE WITH URINARY OBSTRUCTION: Primary | ICD-10-CM

## 2025-05-07 DIAGNOSIS — R35.81 NOCTURNAL POLYURIA: ICD-10-CM

## 2025-05-07 DIAGNOSIS — N13.8 ENLARGED PROSTATE WITH URINARY OBSTRUCTION: Primary | ICD-10-CM

## 2025-05-07 DIAGNOSIS — N30.90 CYSTITIS WITHOUT HEMATURIA: ICD-10-CM

## 2025-05-07 DIAGNOSIS — N32.81 OVERACTIVE BLADDER: ICD-10-CM

## 2025-05-07 LAB
BILIRUBIN, UA POC OHS: NEGATIVE
BLOOD, UA POC OHS: ABNORMAL
CLARITY, UA POC OHS: ABNORMAL
COLOR, UA POC OHS: YELLOW
GLUCOSE, UA POC OHS: NEGATIVE
KETONES, UA POC OHS: NEGATIVE
LEUKOCYTES, UA POC OHS: ABNORMAL
NITRITE, UA POC OHS: POSITIVE
PH, UA POC OHS: 7
PROTEIN, UA POC OHS: 30
SPECIFIC GRAVITY, UA POC OHS: 1.02
UROBILINOGEN, UA POC OHS: 0.2

## 2025-05-07 PROCEDURE — 1101F PT FALLS ASSESS-DOCD LE1/YR: CPT | Mod: CPTII,S$GLB,, | Performed by: UROLOGY

## 2025-05-07 PROCEDURE — 99214 OFFICE O/P EST MOD 30 MIN: CPT | Mod: S$GLB,,, | Performed by: UROLOGY

## 2025-05-07 PROCEDURE — 87086 URINE CULTURE/COLONY COUNT: CPT | Performed by: UROLOGY

## 2025-05-07 PROCEDURE — 1126F AMNT PAIN NOTED NONE PRSNT: CPT | Mod: CPTII,S$GLB,, | Performed by: UROLOGY

## 2025-05-07 PROCEDURE — 81003 URINALYSIS AUTO W/O SCOPE: CPT | Mod: QW,S$GLB,, | Performed by: UROLOGY

## 2025-05-07 PROCEDURE — 3288F FALL RISK ASSESSMENT DOCD: CPT | Mod: CPTII,S$GLB,, | Performed by: UROLOGY

## 2025-05-07 PROCEDURE — 99999 PR PBB SHADOW E&M-EST. PATIENT-LVL II: CPT | Mod: PBBFAC,,, | Performed by: UROLOGY

## 2025-05-07 PROCEDURE — 1157F ADVNC CARE PLAN IN RCRD: CPT | Mod: CPTII,S$GLB,, | Performed by: UROLOGY

## 2025-05-07 RX ORDER — CEPHALEXIN 500 MG/1
500 CAPSULE ORAL EVERY 8 HOURS
Qty: 21 CAPSULE | Refills: 0 | Status: SHIPPED | OUTPATIENT
Start: 2025-05-07 | End: 2025-05-14

## 2025-05-07 RX ORDER — MIRABEGRON 50 MG/1
50 TABLET, FILM COATED, EXTENDED RELEASE ORAL DAILY
Qty: 30 TABLET | Refills: 11 | Status: SHIPPED | OUTPATIENT
Start: 2025-05-07 | End: 2026-05-07

## 2025-05-07 NOTE — PROGRESS NOTES
"Subjective:       Patient ID: Tripp Myers is a 84 y.o. male.    Chief Complaint: Annual Exam    HPI    CHIEF COMPLAINT:  Patient presents today for increased nighttime urination.    URINARY SYMPTOMS:  He reports nocturia with 8-9 episodes per night that started a few months ago, each void producing small amounts of urine. He denies any daytime urinary problems, reporting normal daytime urination.    MEDICAL HISTORY:  He has a history of bladder cancer diagnosed in 1993 by Dr. Chen.  No recurrences in his last cystoscopy in 2022 was unremarkable.  He also has BPH and underwent Urolift procedure in 2018. He has had two episodes requiring drainage of fluid from lungs.    DYSPHAGIA:  He has experienced swallowing difficulties over the past year leading to PEG tube placement during hospitalization in September/October. He can swallow pills with food but reports episodes where his throat "locks up," requiring multiple attempts for successful pill ingestion.    PERIPHERAL EDEMA:  He has persistent leg swelling since a leg fracture 3 years ago. He sleeps with feet elevated on a wedge which alleviates the swelling overnight.    MEDICATIONS:  He takes Lasix in the morning or at noon and denies taking any bladder medications currently.    LABS:  PSA was 0.81 in August 2024.    ROS:  General: -fever, -chills, -fatigue, -weight gain, -weight loss  Eyes: -vision changes, -redness, -discharge  ENT: -ear pain, -nasal congestion, -sore throat  Cardiovascular: -chest pain, -palpitations, +lower extremity edema  Respiratory: -cough, -shortness of breath  Gastrointestinal: -abdominal pain, -nausea, -vomiting, -diarrhea, -constipation, -blood in stool, +difficulty swallowing  Genitourinary: -dysuria, -hematuria, -frequency, +excessive urination, +difficulty urinating  Musculoskeletal: -joint pain, -muscle pain  Skin: -rash, -lesion  Neurological: -headache, -dizziness, -numbness, -tingling  Psychiatric: -anxiety, " -depression, -sleep difficulty             Objective:      Urine dipstick shows negative for glucose, positive for nitrites, 2+leukocytes, trace blood, trace protein.      Physical Exam  Vitals reviewed.   Constitutional:       Appearance: He is well-developed.   Pulmonary:      Effort: Pulmonary effort is normal.   Neurological:      Mental Status: He is alert and oriented to person, place, and time.           Assessment & Plan    R35.81 Nocturnal polyuria  R35.1 Nocturia  N39.0 Urinary tract infection, site not specified  R13.12 Dysphagia, oropharyngeal phase  R60.9 Edema, unspecified  Z85.51 Personal history of malignant neoplasm of bladder  Z93.1 Gastrostomy status  Z87.898 Personal history of other specified conditions    IMPRESSION:  - Assessed frequent nighttime urination as nocturnal polyuria related to fluid accumulation in legs, not a bladder or prostate issue.  - Evaluated prostate health, noting low PSA (0.81 in August 2024) and previous UroLift procedure in 2018.  - Determined prostate cancer screening no longer necessary given age (84) and low PSA.  - Assessed history of bladder cancer (diagnosed in 1993); determined ongoing cystoscopy surveillance likely unnecessary after 30 years without recurrence.    NOCTURNAL POLYURIA AND NOCTURIA:  - Explained nocturnal polyuria mechanism: fluid accumulation in legs during day shifts to bladder at night when legs are elevated.  - Discussed limited treatment options for nocturnal polyuria, emphasizing management rather than cure.  - Patient to use compression socks during the day.  - Patient to elevate legs during the day to reduce fluid accumulation.  - Started Myrbetriq daily to potentially improve bladder storage and decrease frequency/urgency.  - Contact the office if Myrbetriq is not helpful after 1-2 months of use.    URINARY TRACT INFECTION:  - Started Keflex (antibiotic) for 1 week.  - Ordered urine culture due to urine appearance.        Assessment:          1. Enlarged prostate with urinary obstruction    2. Cystitis without hematuria    3. Nocturnal polyuria    4. Overactive bladder          This note was generated with the assistance of ambient listening technology.  I attest to having reviewed and edited the generated note for accuracy, though some syntax or spelling errors may persist. Please contact the author of this note for any clarification.    Plan:       Enlarged prostate with urinary obstruction  -     POCT Urinalysis(Instrument)    Cystitis without hematuria  -     Urine Culture High Risk    Nocturnal polyuria    Overactive bladder    Other orders  -     cephALEXin (KEFLEX) 500 MG capsule; Take 1 capsule (500 mg total) by mouth every 8 (eight) hours. for 7 days  Dispense: 21 capsule; Refill: 0  -     mirabegron (MYRBETRIQ) 50 mg Tb24; Take 1 tablet (50 mg total) by mouth once daily.  Dispense: 30 tablet; Refill: 11

## 2025-05-11 LAB — BACTERIA UR CULT: ABNORMAL

## 2025-05-12 ENCOUNTER — RESULTS FOLLOW-UP (OUTPATIENT)
Dept: UROLOGY | Facility: CLINIC | Age: 85
End: 2025-05-12